# Patient Record
Sex: FEMALE | Race: ASIAN | NOT HISPANIC OR LATINO | Employment: OTHER | URBAN - METROPOLITAN AREA
[De-identification: names, ages, dates, MRNs, and addresses within clinical notes are randomized per-mention and may not be internally consistent; named-entity substitution may affect disease eponyms.]

---

## 2017-01-06 ENCOUNTER — APPOINTMENT (OUTPATIENT)
Dept: MAMMOGRAPHY | Facility: HOSPITAL | Age: 82
End: 2017-01-06

## 2017-01-11 ENCOUNTER — HOSPITAL ENCOUNTER (OUTPATIENT)
Dept: PET IMAGING | Facility: HOSPITAL | Age: 82
End: 2017-01-11
Attending: INTERNAL MEDICINE

## 2017-01-11 ENCOUNTER — HOSPITAL ENCOUNTER (OUTPATIENT)
Dept: CT IMAGING | Facility: HOSPITAL | Age: 82
Discharge: HOME | End: 2017-01-11
Attending: INTERNAL MEDICINE | Admitting: INTERNAL MEDICINE

## 2017-01-11 DIAGNOSIS — C80.1 ADENOCARCINOMA (HCC): ICD-10-CM

## 2017-01-11 PROCEDURE — 71250 CT THORAX DX C-: CPT

## 2017-01-27 ENCOUNTER — APPOINTMENT (OUTPATIENT)
Dept: LAB | Facility: HOSPITAL | Age: 82
End: 2017-01-27

## 2017-02-15 ENCOUNTER — HOSPITAL ENCOUNTER (OUTPATIENT)
Dept: MAMMOGRAPHY | Facility: HOSPITAL | Age: 82
Discharge: HOME | End: 2017-02-15
Admitting: INTERNAL MEDICINE

## 2017-02-15 ENCOUNTER — APPOINTMENT (OUTPATIENT)
Dept: MAMMOGRAPHY | Facility: HOSPITAL | Age: 82
End: 2017-02-15

## 2017-02-15 DIAGNOSIS — Z13.9 SCREENING: ICD-10-CM

## 2017-02-15 PROCEDURE — G0202 SCR MAMMO BI INCL CAD: HCPCS

## 2017-02-28 ENCOUNTER — LAB (OUTPATIENT)
Dept: LAB | Facility: HOSPITAL | Age: 82
End: 2017-02-28

## 2017-02-28 ENCOUNTER — OFFICE VISIT (OUTPATIENT)
Dept: ONCOLOGY | Facility: CLINIC | Age: 82
End: 2017-02-28

## 2017-02-28 VITALS
TEMPERATURE: 97.6 F | DIASTOLIC BLOOD PRESSURE: 60 MMHG | RESPIRATION RATE: 16 BRPM | BODY MASS INDEX: 19.67 KG/M2 | HEART RATE: 54 BPM | HEIGHT: 60 IN | SYSTOLIC BLOOD PRESSURE: 140 MMHG | WEIGHT: 100.2 LBS | OXYGEN SATURATION: 99 %

## 2017-02-28 DIAGNOSIS — C80.1 ADENOCARCINOMA (HCC): ICD-10-CM

## 2017-02-28 DIAGNOSIS — C80.1 ADENOCARCINOMA (HCC): Primary | ICD-10-CM

## 2017-02-28 LAB
BASOPHILS # BLD AUTO: 0.04 10*3/MM3 (ref 0–0.1)
BASOPHILS NFR BLD AUTO: 0.6 % (ref 0–1.1)
DEPRECATED RDW RBC AUTO: 40.5 FL (ref 37–49)
EOSINOPHIL # BLD AUTO: 0.09 10*3/MM3 (ref 0–0.36)
EOSINOPHIL NFR BLD AUTO: 1.3 % (ref 1–5)
ERYTHROCYTE [DISTWIDTH] IN BLOOD BY AUTOMATED COUNT: 12.3 % (ref 11.7–14.5)
HCT VFR BLD AUTO: 38.7 % (ref 34–45)
HGB BLD-MCNC: 12.5 G/DL (ref 11.5–14.9)
IMM GRANULOCYTES # BLD: 0.05 10*3/MM3 (ref 0–0.03)
IMM GRANULOCYTES NFR BLD: 0.7 % (ref 0–0.5)
LYMPHOCYTES # BLD AUTO: 1.7 10*3/MM3 (ref 1–3.5)
LYMPHOCYTES NFR BLD AUTO: 23.7 % (ref 20–49)
MCH RBC QN AUTO: 29 PG (ref 27–33)
MCHC RBC AUTO-ENTMCNC: 32.3 G/DL (ref 32–35)
MCV RBC AUTO: 89.8 FL (ref 83–97)
MONOCYTES # BLD AUTO: 0.55 10*3/MM3 (ref 0.25–0.8)
MONOCYTES NFR BLD AUTO: 7.7 % (ref 4–12)
NEUTROPHILS # BLD AUTO: 4.75 10*3/MM3 (ref 1.5–7)
NEUTROPHILS NFR BLD AUTO: 66 % (ref 39–75)
NRBC BLD MANUAL-RTO: 0 /100 WBC (ref 0–0)
PLATELET # BLD AUTO: 235 10*3/MM3 (ref 150–375)
PMV BLD AUTO: 10.4 FL (ref 8.9–12.1)
RBC # BLD AUTO: 4.31 10*6/MM3 (ref 3.9–5)
WBC NRBC COR # BLD: 7.18 10*3/MM3 (ref 4–10)

## 2017-02-28 PROCEDURE — 85025 COMPLETE CBC W/AUTO DIFF WBC: CPT | Performed by: INTERNAL MEDICINE

## 2017-02-28 PROCEDURE — 36416 COLLJ CAPILLARY BLOOD SPEC: CPT | Performed by: INTERNAL MEDICINE

## 2017-02-28 PROCEDURE — 99214 OFFICE O/P EST MOD 30 MIN: CPT | Performed by: INTERNAL MEDICINE

## 2017-02-28 RX ORDER — AMLODIPINE AND VALSARTAN 10; 320 MG/1; MG/1
1 TABLET ORAL DAILY
COMMUNITY
End: 2018-07-18 | Stop reason: ALTCHOICE

## 2017-02-28 NOTE — PROGRESS NOTES
Subjective    REASONS FOR FOLLOWUP:   1. 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   2. 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   3. Primary radiation to left lung bronchoalveolar carcinoma given in March 2013.   4. EGFR mutation negative in the left upper lobe cancer.   5. Multinodular goiter.          History of Present Illness  patient is an 86-year-old female with remote history of bronchioloalveolar carcinoma in 2002 and a second malignancy in the left lung in 2013 treated with focused radiation.  She comes in today with a 6 month follow-up and scans and overall she is doing well.  She has no weight loss or hemoptysis or pain in the chest.  She has a chronic cough but is not very significant and does not require much treatment.  Appetite and energy are fine.  She continues with mammography and follows with her family doctor for her medical issues routinely.  Thankfully the CAT scan shows stable 6mm  nodule in the right middle lobe since 2012 with no new abnormalities.  Her thyroid goiter is also stable.  She's had a mammogram and this was normal but I think at her age we could stop the mammograms and after discussion with her we have decided to stop CAT scans also because she is not likely to take any treatment if we find any malignancies and we will speak to chest x-rays for the time being as she would consider focused radiation alone if there was an option to do so     PAST MEDICAL HISTORY: Significant for type 2 diabetes, a leaky mitral valve, history of positive PPD, history of multinodular goiter with a thyroid needle biopsy, which was negative, history of osteoarthritis, hypertension and hypercholesterolemia and is on medication for this , and she has some reflux type symptoms.     PAST SURGICAL HISTORY: Hysterectomy in 1970, benign cyst removed from the vocal cord in 1972, cataract surgery and right upper lobectomy in June 2002.       OB/GYN HISTORY:   1, para 1.     ONCOLOGIC HISTORY: History from previous dates can be found in the separate document.   ONCOLOGIC HISTORY: Well differentiated adenocarcinoma left upper lobe measuring 2 x 2 x 1 cm, negative lymph nodes, T1N0 post right upper lobectomy. No adjuvant treatment.   The patient is an 82-year-old Chinese lady with history of T1N0 well-differentiated adenocarcinoma of the right upper lobe, treated with lobectomy in 2002 who was followed perspectively with scans and in  was noted to have a small abnormality in t he left lung which was described as ground glass opacity. This remained unchanged through , but in 2012 CT of the chest showed the ground glass opacity in the perihilar region appeared slightly larger and more confluent than the previous scan i n  and the curvilinear area of patchy focal density in the left apex that was new, which was felt to be scarring. There was no adenopathy appreciated. This led to a bronchoscopy by Dr. Tha Brandt in 2012, which was nondiagnostic. She was also referred to a gastroenterologist because of minimal rectal bleeding and underwent a colonoscopy, which was unremarkable. Followup chest CT on 10/17/12 without contrast at [facility] showed further enlargement of the lingular infiltrate to 2.3 x 2.2 cm comp a red to 1.8 x 2.2 in May. Dr. Brandt discussed the situation with her and told her that he did not think she would tolerate surgery because of her age and the previous right upper lobectomy and felt that she may not do well with chemo and radiation either a nd because she wanted another opinion, she visited her son in [place] and went to [facility] where she was seen at the Lung Clinic. She was presented to their Tumor Board and they recommended that she have a CT with contrast and CT guided biopsy of the are a before making any decisions and repeat PFTs to see if she would be a surgical candidate. The patient proceeded with a CT on  12/27/12 and PFTs and decided to see me for a third opinion regarding her cancer and treatment options.   The patient was present ed at thoracic clinic and CT guided biopsy was felt to be the best approach as they did not think EBUS or navigational bronchoscopy would yield a diagnosis. The radiologist felt this was feasible and was performed 1/7/13 with the findings of a well diffe r entiated adenocarcinoma. Pre-biopsy PET scan showed very low level uptake in the lesion and no adenopathy. The patient was not felt to be a surgical candidate because of her age and previous right upper lobectomy and focused radiation was felt to be her best option. The patient had been to [facility] for a second opinion and they also concurred with this approach. EGFR mutation status is not known but ordered because of her ethnic origin and the fact that if she has recurrence of disease after radiation, thi s could be a consideration of therapy.   The patient is EGFR mutation negative.   Patient treated with primary radiation to the left lung carcinoma in March. CAT scans dated 5/22/13 showed ground-glass opacity in the left hilar region shrunk from 3.1 x 3.1 to 2.2 x 2.   The patient was seen on 02/17/2014 with a repeat CT scan of the chest and abdomen, dated 02/10/2014 that shows a multinodular goiter, which is unchanged, and further resolution of the radiation changes with no definite mass, and the abdomen is benign.   CAT scans dated 8/2014 shows persistent finding of chronic infiltrates stable since February. No signs of recurrent cancer (no contrast given).   The patient was seen 02/05/2015 with a CAT scan that shows stable radiation changes in the left upper lobe and stable small nodule in the right upper lobe. Continued followup planned.   The patient is seen on 07/30/2015 with a CT scan of chest and abdomen with contrast that showed no evidence of recurrent cancer. There are radiation changes in left l nora with fiducial markers in  the region, and previous right upper lobectomy and a stable 6 mm nodule in the right lobe unchanged for over 3 years. Abdomen is benign.   Patient was seen on 2016 with a CT scan of the chest without contrast that showed a stable 6 mm nodule in the right middle lobe and no other lung nodules.     SOCIAL HISTORY: She is  for the second time. She smoked briefly for 18 years and stopped 50 years ago. She is not a drinker.     FAMILY HISTORY: Father  at age 65 of a blood clot. Mother  at age 86 of a fall. She has 2 brothers and 3 sisters. One sister had breast c ancer at a young age and  of kidney failure and one from complications of schizophrenia at 56. Another sister  of heart problems at age 65. No other cancer in the family. Her son is healthy.     Review of Systems   Respiratory: Positive for cough.       A comprehensive 14 point review of systems was performed and was negative except as mentioned.      Current Outpatient Prescriptions:   •  ACETAMINOPHEN PO, Take  by mouth., Disp: , Rfl:   •  amLODIPine-valsartan (EXFORGE)  MG per tablet, Take 1 tablet by mouth Daily., Disp: , Rfl:   •  Aspirin (ASPIR-81 PO), Take  by mouth daily., Disp: , Rfl:   •  atenolol (TENORMIN) 25 MG tablet, Take 25 mg by mouth daily., Disp: , Rfl:   •  Cholecalciferol (VITAMIN D) 1000 UNITS tablet, Take 2 tablets by mouth., Disp: , Rfl:   •  Docusate Sodium (STOOL SOFTENER) 100 MG capsule, Take  by mouth., Disp: , Rfl:   •  fenofibrate (TRICOR) 145 MG tablet, Take  by mouth daily., Disp: , Rfl:   •  glimepiride (AMARYL) 1 MG tablet, Take  by mouth 2 (Two) Times a Day., Disp: , Rfl:   •  losartan (COZAAR) 100 MG tablet, Take  by mouth., Disp: , Rfl:   •  Magnesium 100 MG capsule, Take  by mouth., Disp: , Rfl:   •  Multiple Vitamin (MULTIVITAMIN) tablet, Take 1 tablet by mouth., Disp: , Rfl:   •  rosuvastatin (CRESTOR) 10 MG tablet, Take 10 mg by mouth., Disp: , Rfl:     ALLERGIES:    Allergies   Allergen  "Reactions   • Contrast Dye    • Iodinated Diagnostic Agents Rash       Objective      Vitals:    02/28/17 1127   BP: 140/60   Pulse: 54   Resp: 16   Temp: 97.6 °F (36.4 °C)   TempSrc: Oral   SpO2: 99%   Weight: 100 lb 3.2 oz (45.5 kg)   Height: 59.8\" (151.9 cm)  Comment: new ht.   PainSc: 0-No pain     Current Status 2/28/2017   ECOG score 0       Physical Exam    GENERAL:  Well-developed, well-nourished in no acute distress.   SKIN:  Warm, dry without rashes, purpura or petechiae.  HEAD:  Normocephalic.  EYES:  Pupils equal, round and reactive to light.  EOMs intact.  Conjunctivae normal.  EARS:  Hearing intact.  NOSE:  Septum midline.  No excoriations or nasal discharge.  MOUTH:  Tongue is well-papillated; no stomatitis or ulcers.  Lips normal.  THROAT:  Oropharynx without lesions or exudates.  Moderate thyromegaly is stable  NECK:  Supple with good range of motion; thyromegaly noted no  masses, no JVD.  LYMPHATICS:  No cervical, supraclavicular, axillary or inguinal adenopathy.  CHEST:  Lungs clear to percussion and auscultation. Good airflow.  CARDIAC:  Regular rate and rhythm without murmurs, rubs or gallops. Normal S1,S2.  ABDOMEN:  Soft, nontender with no organomegaly or masses.  EXTREMITIES:  No clubbing, cyanosis or edema.  NEUROLOGICAL:  Cranial Nerves II-XII grossly intact.  No focal neurological deficits.  PSYCHIATRIC:  Normal affect and mood.        RECENT LABS:  Hematology WBC   Date Value Ref Range Status   02/28/2017 7.18 4.00 - 10.00 10*3/mm3 Final   02/26/2016 5.67 4.50 - 10.70 K/Cumm Final     RBC   Date Value Ref Range Status   02/28/2017 4.31 3.90 - 5.00 10*6/mm3 Final   02/26/2016 4.48 3.90 - 5.20 Million Final     HEMOGLOBIN   Date Value Ref Range Status   02/28/2017 12.5 11.5 - 14.9 g/dL Final   02/26/2016 13.0 11.9 - 15.5 g/dL Final     HEMATOCRIT   Date Value Ref Range Status   02/28/2017 38.7 34.0 - 45.0 % Final   02/26/2016 41.1 35.6 - 45.5 % Final     PLATELETS   Date Value Ref Range " Status   02/28/2017 235 150 - 375 10*3/mm3 Final   02/26/2016 309 140 - 500 K/Cumm Final              CT CHEST  FINDINGS: The patient is status post right upper lobectomy. There is a  small approximately 6 mm diameter noncalcified pulmonary nodule in the  posterior aspect of the right middle lobe that remains stable. Chronic  areas of consolidation and atelectasis in the medial aspect of the left  lung are also unchanged. These are likely sequela of radiation therapy.  There are no acute infiltrates or pleural effusions. There is no  mediastinal or hilar or axillary lymphadenopathy. The thyroid gland is  moderately enlarged and contains multiple areas of nodularity. Findings  are consistent with multinodular goiter. This is unchanged. The enlarged  gland produces mild tracheal compression.      IMPRESSION:  Postoperative changes and post RT changes as described.  Small noncalcified right middle lobe pulmonary nodule. There has been no  significant change since the preceding CT chest dated 08/01/2016.      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   3.primary radiation to left lung bronchoalveolar carcinoma given in March 2013.   4.EGFR mutation negative in the left upper lobe cancer.   5.Multinodular goiter.     Plan  Return in 6 months with c x-ray and we will not plan any further CAT scans at this time and I told her she could also stop her annual mammograms at her age            2/28/2017      CC:

## 2017-03-24 DIAGNOSIS — R00.2 PALPITATIONS: Primary | ICD-10-CM

## 2017-03-29 ENCOUNTER — OFFICE VISIT (OUTPATIENT)
Dept: CARDIOLOGY | Facility: CLINIC | Age: 82
End: 2017-03-29

## 2017-03-29 VITALS
BODY MASS INDEX: 21.2 KG/M2 | HEART RATE: 62 BPM | HEIGHT: 58 IN | WEIGHT: 101 LBS | SYSTOLIC BLOOD PRESSURE: 124 MMHG | DIASTOLIC BLOOD PRESSURE: 74 MMHG

## 2017-03-29 DIAGNOSIS — I34.0 NON-RHEUMATIC MITRAL REGURGITATION: ICD-10-CM

## 2017-03-29 DIAGNOSIS — R00.2 PALPITATION: ICD-10-CM

## 2017-03-29 DIAGNOSIS — I10 ESSENTIAL HYPERTENSION: Primary | ICD-10-CM

## 2017-03-29 PROCEDURE — 99213 OFFICE O/P EST LOW 20 MIN: CPT | Performed by: INTERNAL MEDICINE

## 2017-03-29 PROCEDURE — 93000 ELECTROCARDIOGRAM COMPLETE: CPT | Performed by: INTERNAL MEDICINE

## 2017-03-29 NOTE — PROGRESS NOTES
Date of Office Visit: 2017  Encounter Provider: Moe Heredia MD  Place of Service: [facility] CARDIOLOGY  Patient Name: Gael Tarango  :1930      Chief Complaint   Patient presents with   • Palpitations     History of Present Illness  Patient is an 86-year-old female with a history of mitral regurgitation as well as hypertension.  She returns to the office today with complaints of palpitations.  She states she hears a pounding in her chest that occurs mostly when she is up working at her desk during the day.  She does not.  If she is standing up or ambulating.  She does not.  Nocturnally.  A recent Holter monitor was placed which was very benign showing only occasional unifocal premature ventricular ectopics and rare PACs.  There is no evidence of atrial fibrillation.  She is on both atenolol and Exforge for blood pressure control.  Her pressure has been under better control with the addition of the Exforge.  She takes her atenolol at night and the Exforge in the morning.  I suggested that before we change her medication that she try reversing the time scheduled.  Her symptoms appeared to be during the day and perhaps the atenolol might be a better choice for her morning medication.      Past Medical History:   Diagnosis Date   • Arthritis    • Diabetes mellitus     Type 2   • Disease of thyroid gland    • Goiter     Multinodular   • Lung cancer     Right lung, stage IA bronchoalveolar carcinoma   • Lung cancer     Left lung T1N0, well differentiated bronchoalveolar carcinoma   • Mitral valve regurgitation    • 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     Positive PPD   • Vocal cord cyst     Removed         Past Surgical History:   Procedure Laterality Date   • CATARACT EXTRACTION Bilateral    • HYSTERECTOMY  1970   • LUNG LOBECTOMY Right 2002   • VOCAL CORD BIOPSY  1972    Benign cyst removed           Current Outpatient Prescriptions:   •  ACETAMINOPHEN PO, Take   by mouth., Disp: , Rfl:   •  amLODIPine-valsartan (EXFORGE)  MG per tablet, Take 1 tablet by mouth Daily., Disp: , Rfl:   •  Aspirin (ASPIR-81 PO), Take  by mouth daily., Disp: , Rfl:   •  atenolol (TENORMIN) 25 MG tablet, Take 25 mg by mouth daily., Disp: , Rfl:   •  Cholecalciferol (VITAMIN D) 1000 UNITS tablet, Take 2 tablets by mouth., Disp: , Rfl:   •  Docusate Sodium (STOOL SOFTENER) 100 MG capsule, Take  by mouth., Disp: , Rfl:   •  fenofibrate (TRICOR) 145 MG tablet, Take  by mouth daily., Disp: , Rfl:   •  glimepiride (AMARYL) 1 MG tablet, Take  by mouth 2 (Two) Times a Day., Disp: , Rfl:   •  Magnesium 100 MG capsule, Take  by mouth., Disp: , Rfl:   •  Multiple Vitamin (MULTIVITAMIN) tablet, Take 1 tablet by mouth., Disp: , Rfl:   •  rosuvastatin (CRESTOR) 10 MG tablet, Take 10 mg by mouth., Disp: , Rfl:       Social History     Social History   • Marital status:      Spouse name: N/A   • Number of children: N/A   • Years of education: College     Occupational History   • Teacher Retired     [employer]     Social History Main Topics   • Smoking status: Former Smoker     Packs/day: 1.00     Years: 18.00     Types: Cigarettes   • Smokeless tobacco: Not on file   • Alcohol use No   • Drug use: No   • Sexual activity: Not on file     Other Topics Concern   • Not on file     Social History Narrative         Review of Systems   Constitution: Negative.   HENT: Negative.    Eyes: Negative.    Cardiovascular: Positive for palpitations.   Respiratory: Negative.    Endocrine: Negative.    Skin: Negative.    Musculoskeletal: Negative.    Gastrointestinal: Negative.    Neurological: Negative.    Psychiatric/Behavioral: Negative.        Procedures      ECG 12 Lead  Date/Time: 3/29/2017 3:49 PM  Performed by: BARTOLO MENDEZ  Authorized by: BARTOLO MENDEZ   Comparison: compared with previous ECG from 9/1/2016  Rhythm: sinus rhythm  Rate: normal  Conduction: left bundle branch block              "  Objective:    /74  Pulse 62  Ht 58\" (147.3 cm)  Wt 101 lb (45.8 kg)  LMP  (Approximate)  BMI 21.11 kg/m2        Physical Exam   Constitutional: She is oriented to person, place, and time. She appears well-developed and well-nourished.   HENT:   Head: Normocephalic.   Eyes: Pupils are equal, round, and reactive to light.   Neck: Normal range of motion. No JVD present. Carotid bruit is not present. No thyromegaly present.   Cardiovascular: Normal rate, regular rhythm, S1 normal, S2 normal and intact distal pulses.  Exam reveals no gallop and no friction rub.    Murmur heard.  High-pitched blowing holosystolic murmur is present with a grade of 1/6  at the apex  Pulmonary/Chest: Effort normal and breath sounds normal.   Abdominal: Soft. Bowel sounds are normal.   Musculoskeletal: She exhibits no edema.   Neurological: She is alert and oriented to person, place, and time.   Skin: Skin is warm, dry and intact. No erythema.   Psychiatric: She has a normal mood and affect.   Vitals reviewed.          Assessment:       Diagnosis Plan   1. Essential hypertension     2. Non-rheumatic mitral regurgitation     3. Palpitation              Plan:       She is going to take the atenolol and the morning and the Exforge in the evening as I discussed above.  I'm hopeful that this will provide her some relief of her symptoms.  I cannot find anything of significance on the examination otherwise.  "

## 2017-04-06 ENCOUNTER — TELEPHONE (OUTPATIENT)
Dept: CARDIOLOGY | Facility: CLINIC | Age: 82
End: 2017-04-06

## 2017-04-06 NOTE — TELEPHONE ENCOUNTER
"Pt called with a 1 week update.   She states that since switching her Atenolol to 10:00 am the \"loud thumping\" has improved. She still notices it early before taking her med. It does return after meals but it goes away shortly after.     She would like you to advise further.     Pt can be reached at [phone]  "

## 2017-04-14 NOTE — TELEPHONE ENCOUNTER
Pt called. She states that she noticed no change until this past Wednesday. Now the thumping only happens in spurts. They last from 5-45 minutes. She has noticed they are normally triggered by exertion.     Her bp today was 132/62 hr 43.    She would like you to advise.     Pt can be reached at [phone]

## 2017-04-21 RX ORDER — ATENOLOL 25 MG/1
37.5 TABLET ORAL 2 TIMES DAILY
Qty: 270 TABLET | Refills: 1 | Status: SHIPPED | OUTPATIENT
Start: 2017-04-21 | End: 2017-09-30 | Stop reason: SDUPTHER

## 2017-04-21 NOTE — TELEPHONE ENCOUNTER
Pt called with an update, the thumping of her heart was better this week, only happening for about 10 minutes when eating breakfast. Today she didn't have any. She feels she is almost back to normal. She will need a new rx for the increased dose that I will send to [facility]. Pt # [phone]. dmk

## 2017-08-25 ENCOUNTER — OFFICE VISIT (OUTPATIENT)
Dept: ONCOLOGY | Facility: CLINIC | Age: 82
End: 2017-08-25

## 2017-08-25 ENCOUNTER — LAB (OUTPATIENT)
Dept: LAB | Facility: HOSPITAL | Age: 82
End: 2017-08-25

## 2017-08-25 ENCOUNTER — APPOINTMENT (OUTPATIENT)
Dept: GENERAL RADIOLOGY | Facility: HOSPITAL | Age: 82
End: 2017-08-25

## 2017-08-25 VITALS
HEART RATE: 51 BPM | TEMPERATURE: 97.8 F | BODY MASS INDEX: 19.71 KG/M2 | RESPIRATION RATE: 16 BRPM | OXYGEN SATURATION: 100 % | WEIGHT: 100.4 LBS | SYSTOLIC BLOOD PRESSURE: 156 MMHG | DIASTOLIC BLOOD PRESSURE: 64 MMHG | HEIGHT: 60 IN

## 2017-08-25 DIAGNOSIS — C80.1 ADENOCARCINOMA (HCC): Primary | ICD-10-CM

## 2017-08-25 DIAGNOSIS — C34.2 MALIGNANT NEOPLASM OF MIDDLE LOBE OF RIGHT LUNG (HCC): ICD-10-CM

## 2017-08-25 DIAGNOSIS — C80.1 ADENOCARCINOMA (HCC): ICD-10-CM

## 2017-08-25 LAB
ALBUMIN SERPL-MCNC: 4.4 G/DL (ref 3.5–5.2)
ALBUMIN/GLOB SERPL: 1.8 G/DL (ref 1.1–2.4)
ALP SERPL-CCNC: 46 U/L (ref 38–116)
ALT SERPL W P-5'-P-CCNC: 21 U/L (ref 0–33)
ANION GAP SERPL CALCULATED.3IONS-SCNC: 11.2 MMOL/L
AST SERPL-CCNC: 27 U/L (ref 0–32)
BASOPHILS # BLD AUTO: 0.03 10*3/MM3 (ref 0–0.1)
BASOPHILS NFR BLD AUTO: 0.5 % (ref 0–1.1)
BILIRUB SERPL-MCNC: 0.4 MG/DL (ref 0.1–1.2)
BUN BLD-MCNC: 20 MG/DL (ref 6–20)
BUN/CREAT SERPL: 23.8 (ref 7.3–30)
CALCIUM SPEC-SCNC: 9.8 MG/DL (ref 8.5–10.2)
CHLORIDE SERPL-SCNC: 99 MMOL/L (ref 98–107)
CO2 SERPL-SCNC: 28.8 MMOL/L (ref 22–29)
CREAT BLD-MCNC: 0.84 MG/DL (ref 0.6–1.1)
DEPRECATED RDW RBC AUTO: 42.5 FL (ref 37–49)
EOSINOPHIL # BLD AUTO: 0.09 10*3/MM3 (ref 0–0.36)
EOSINOPHIL NFR BLD AUTO: 1.4 % (ref 1–5)
ERYTHROCYTE [DISTWIDTH] IN BLOOD BY AUTOMATED COUNT: 12.9 % (ref 11.7–14.5)
GFR SERPL CREATININE-BSD FRML MDRD: 64 ML/MIN/1.73
GFR SERPL CREATININE-BSD FRML MDRD: 78 ML/MIN/1.73
GLOBULIN UR ELPH-MCNC: 2.4 GM/DL (ref 1.8–3.5)
GLUCOSE BLD-MCNC: 204 MG/DL (ref 74–124)
HCT VFR BLD AUTO: 38.9 % (ref 34–45)
HGB BLD-MCNC: 13 G/DL (ref 11.5–14.9)
HOLD SPECIMEN: NORMAL
IMM GRANULOCYTES # BLD: 0.03 10*3/MM3 (ref 0–0.03)
IMM GRANULOCYTES NFR BLD: 0.5 % (ref 0–0.5)
LYMPHOCYTES # BLD AUTO: 1.51 10*3/MM3 (ref 1–3.5)
LYMPHOCYTES NFR BLD AUTO: 23.3 % (ref 20–49)
MCH RBC QN AUTO: 30.3 PG (ref 27–33)
MCHC RBC AUTO-ENTMCNC: 33.4 G/DL (ref 32–35)
MCV RBC AUTO: 90.7 FL (ref 83–97)
MONOCYTES # BLD AUTO: 0.57 10*3/MM3 (ref 0.25–0.8)
MONOCYTES NFR BLD AUTO: 8.8 % (ref 4–12)
NEUTROPHILS # BLD AUTO: 4.25 10*3/MM3 (ref 1.5–7)
NEUTROPHILS NFR BLD AUTO: 65.5 % (ref 39–75)
NRBC BLD MANUAL-RTO: 0 /100 WBC (ref 0–0)
PLATELET # BLD AUTO: 267 10*3/MM3 (ref 150–375)
PMV BLD AUTO: 9.4 FL (ref 8.9–12.1)
POTASSIUM BLD-SCNC: 4.2 MMOL/L (ref 3.5–4.7)
PROT SERPL-MCNC: 6.8 G/DL (ref 6.3–8)
RBC # BLD AUTO: 4.29 10*6/MM3 (ref 3.9–5)
SODIUM BLD-SCNC: 139 MMOL/L (ref 134–145)
WBC NRBC COR # BLD: 6.48 10*3/MM3 (ref 4–10)

## 2017-08-25 PROCEDURE — 80053 COMPREHEN METABOLIC PANEL: CPT | Performed by: INTERNAL MEDICINE

## 2017-08-25 PROCEDURE — 71020 HC CHEST PA AND LATERAL: CPT

## 2017-08-25 PROCEDURE — 99213 OFFICE O/P EST LOW 20 MIN: CPT | Performed by: INTERNAL MEDICINE

## 2017-08-25 PROCEDURE — 85025 COMPLETE CBC W/AUTO DIFF WBC: CPT | Performed by: INTERNAL MEDICINE

## 2017-08-25 PROCEDURE — 36415 COLL VENOUS BLD VENIPUNCTURE: CPT | Performed by: INTERNAL MEDICINE

## 2017-08-25 RX ORDER — MULTIVITAMIN WITH IRON
TABLET ORAL DAILY
COMMUNITY

## 2017-08-25 NOTE — PROGRESS NOTES
Subjective    REASONS FOR FOLLOWUP:   1. 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   2. 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   3. Primary radiation to left lung bronchoalveolar carcinoma given in 2013.   4. EGFR mutation negative in the left upper lobe cancer.   5. Multinodular goiter.          History of Present Illness patient is an 86-year-old female with 2 separate lung cancers treated with surgery on 1 occasion and radiation on the other most recent one being in 2013.  She comes in for follow-up today doing fairly well.  Her cough is actually improved.  Weight stable appetite is stable she has no new complaints  She had a chest x-ray today which was thankfully benign but there is some scarring in the right upper lobe that may need some closer detail.  We discussed and decided to do a noncontrast chest CT at her next visit just to make sure there's been no change as she is actually pretty healthy and may be able to tolerate focused radiation again if a new cancer pops up.      PAST MEDICAL HISTORY: Significant for type 2 diabetes, a leaky mitral valve, history of positive PPD, history of multinodular goiter with a thyroid needle biopsy, which was negative, history of osteoarthritis, hypertension and hypercholesterolemia and is on medication for this , and she has some reflux type symptoms.     PAST SURGICAL HISTORY: Hysterectomy in , benign cyst removed from the vocal cord in , cataract surgery and right upper lobectomy in 2002.       OB/GYN HISTORY:  1, para 1.     ONCOLOGIC HISTORY: History from previous dates can be found in the separate document.   ONCOLOGIC HISTORY: Well differentiated adenocarcinoma left upper lobe measuring 2 x 2 x 1 cm, negative lymph nodes, T1N0 post right upper lobectomy. No adjuvant treatment.   The patient is an 82-year-old Chinese lady with history of T1N0 well-differentiated adenocarcinoma of  the right upper lobe, treated with lobectomy in June 2002 who was followed perspectively with scans and in 2008 was noted to have a small abnormality in t he left lung which was described as ground glass opacity. This remained unchanged through 2010, but in April 2012 CT of the chest showed the ground glass opacity in the perihilar region appeared slightly larger and more confluent than the previous scan i n 2010 and the curvilinear area of patchy focal density in the left apex that was new, which was felt to be scarring. There was no adenopathy appreciated. This led to a bronchoscopy by Dr. Tha Brandt in June 2012, which was nondiagnostic. She was also referred to a gastroenterologist because of minimal rectal bleeding and underwent a colonoscopy, which was unremarkable. Followup chest CT on 10/17/12 without contrast at [facility] showed further enlargement of the lingular infiltrate to 2.3 x 2.2 cm comp a red to 1.8 x 2.2 in May. Dr. Brandt discussed the situation with her and told her that he did not think she would tolerate surgery because of her age and the previous right upper lobectomy and felt that she may not do well with chemo and radiation either a nd because she wanted another opinion, she visited her son in [place] and went to [facility] where she was seen at the Lung Clinic. She was presented to their Tumor Board and they recommended that she have a CT with contrast and CT guided biopsy of the are a before making any decisions and repeat PFTs to see if she would be a surgical candidate. The patient proceeded with a CT on 12/27/12 and PFTs and decided to see me for a third opinion regarding her cancer and treatment options.   The patient was present ed at thoracic clinic and CT guided biopsy was felt to be the best approach as they did not think EBUS or navigational bronchoscopy would yield a diagnosis. The radiologist felt this was feasible and was performed 1/7/13 with the findings of a well diffe r  entiated adenocarcinoma. Pre-biopsy PET scan showed very low level uptake in the lesion and no adenopathy. The patient was not felt to be a surgical candidate because of her age and previous right upper lobectomy and focused radiation was felt to be her best option. The patient had been to [facility] for a second opinion and they also concurred with this approach. EGFR mutation status is not known but ordered because of her ethnic origin and the fact that if she has recurrence of disease after radiation, thi s could be a consideration of therapy.   The patient is EGFR mutation negative.   Patient treated with primary radiation to the left lung carcinoma in March. CAT scans dated 5/22/13 showed ground-glass opacity in the left hilar region shrunk from 3.1 x 3.1 to 2.2 x 2.   The patient was seen on 02/17/2014 with a repeat CT scan of the chest and abdomen, dated 02/10/2014 that shows a multinodular goiter, which is unchanged, and further resolution of the radiation changes with no definite mass, and the abdomen is benign.   CAT scans dated 8/2014 shows persistent finding of chronic infiltrates stable since February. No signs of recurrent cancer (no contrast given).   The patient was seen 02/05/2015 with a CAT scan that shows stable radiation changes in the left upper lobe and stable small nodule in the right upper lobe. Continued followup planned.   The patient is seen on 07/30/2015 with a CT scan of chest and abdomen with contrast that showed no evidence of recurrent cancer. There are radiation changes in left l nora with fiducial markers in the region, and previous right upper lobectomy and a stable 6 mm nodule in the right lobe unchanged for over 3 years. Abdomen is benign.   Patient was seen on 02/22/2016 with a CT scan of the chest without contrast that showed a stable 6 mm nodule in the right middle lobe and no other lung nodules.    patient is an 86-year-old female with remote history of bronchioloalveolar carcinoma  in  and a second malignancy in the left lung in  treated with focused radiation.  She comes in today with a 6 month follow-up and scans and overall she is doing well.  She has no weight loss or hemoptysis or pain in the chest.  She has a chronic cough but is not very significant and does not require much treatment.  Appetite and energy are fine.  She continues with mammography and follows with her family doctor for her medical issues routinely.  Thankfully the CAT scan shows stable 6mm  nodule in the right middle lobe since  with no new abnormalities.  Her thyroid goiter is also stable.  She's had a mammogram and this was normal       SOCIAL HISTORY: She is  for the second time. She smoked briefly for 18 years and stopped 50 years ago. She is not a drinker.     FAMILY HISTORY: Father  at age 65 of a blood clot. Mother  at age 86 of a fall. She has 2 brothers and 3 sisters. One sister had breast c ancer at a young age and  of kidney failure and one from complications of schizophrenia at 56. Another sister  of heart problems at age 65. No other cancer in the family. Her son is healthy.     Review of Systems   Respiratory: Positive for cough.       A comprehensive 14 point review of systems was performed and was negative except as mentioned.      Current Outpatient Prescriptions:   •  ACETAMINOPHEN PO, Take  by mouth., Disp: , Rfl:   •  amLODIPine-valsartan (EXFORGE)  MG per tablet, Take 1 tablet by mouth Daily., Disp: , Rfl:   •  Aspirin (ASPIR-81 PO), Take  by mouth daily., Disp: , Rfl:   •  atenolol (TENORMIN) 25 MG tablet, Take 1.5 tablets by mouth 2 (Two) Times a Day., Disp: 270 tablet, Rfl: 1  •  Cholecalciferol (VITAMIN D) 1000 UNITS tablet, Take 2 tablets by mouth., Disp: , Rfl:   •  Docusate Sodium (STOOL SOFTENER) 100 MG capsule, Take  by mouth., Disp: , Rfl:   •  fenofibrate (TRICOR) 145 MG tablet, Take  by mouth daily., Disp: , Rfl:   •  glimepiride (AMARYL) 1 MG  "tablet, Take  by mouth 2 (Two) Times a Day., Disp: , Rfl:   •  Magnesium 250 MG tablet, Take  by mouth Daily., Disp: , Rfl:   •  Multiple Vitamin (MULTIVITAMIN) tablet, Take 1 tablet by mouth., Disp: , Rfl:   •  rosuvastatin (CRESTOR) 10 MG tablet, Take 10 mg by mouth., Disp: , Rfl:     ALLERGIES:    Allergies   Allergen Reactions   • Contrast Dye    • Iodinated Diagnostic Agents Rash       Objective      Vitals:    08/25/17 1310   BP: 156/64   Pulse: 51   Resp: 16   Temp: 97.8 °F (36.6 °C)   TempSrc: Oral   SpO2: 100%   Weight: 100 lb 6.4 oz (45.5 kg)   Height: 60\" (152.4 cm)  Comment: new ht   PainSc: 0-No pain     Current Status 8/25/2017   ECOG score 0       Physical Exam    GENERAL:  Well-developed, well-nourished in no acute distress.   SKIN:  Warm, dry without rashes, purpura or petechiae.  HEAD:  Normocephalic.  EYES:  Pupils equal, round and reactive to light.  EOMs intact.  Conjunctivae normal.  EARS:  Hearing intact.  NOSE:  Septum midline.  No excoriations or nasal discharge.  MOUTH:  Tongue is well-papillated; no stomatitis or ulcers.  Lips normal.  THROAT:  Oropharynx without lesions or exudates.  Moderate thyromegaly is stable  NECK:  Supple with good range of motion; thyromegaly noted no  masses, no JVD.  LYMPHATICS:  No cervical, supraclavicular, axillary or inguinal adenopathy.  CHEST:  Lungs clear to percussion and auscultation. Good airflow.  CARDIAC:  Regular rate and rhythm without murmurs, rubs or gallops. Normal S1,S2.  ABDOMEN:  Soft, nontender with no organomegaly or masses.  EXTREMITIES:  No clubbing, cyanosis or edema.  NEUROLOGICAL:  Cranial Nerves II-XII grossly intact.  No focal neurological deficits.  PSYCHIATRIC:  Normal affect and mood.        RECENT LABS:  Hematology WBC   Date Value Ref Range Status   08/25/2017 6.48 4.00 - 10.00 10*3/mm3 Final     RBC   Date Value Ref Range Status   08/25/2017 4.29 3.90 - 5.00 10*6/mm3 Final     Hemoglobin   Date Value Ref Range Status "   08/25/2017 13.0 11.5 - 14.9 g/dL Final     Hematocrit   Date Value Ref Range Status   08/25/2017 38.9 34.0 - 45.0 % Final     Platelets   Date Value Ref Range Status   08/25/2017 267 150 - 375 10*3/mm3 Final                Chest x-ray seems stable on 8/25/17?  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   3.primary radiation to left lung bronchoalveolar carcinoma given in March 2013.   4.EGFR mutation negative in the left upper lobe cancer.   5.Multinodular goiter.     Plan  Return in 6 months with ct chest Without contrast    8/25/2017      CC:

## 2017-10-02 RX ORDER — ATENOLOL 25 MG/1
TABLET ORAL
Qty: 270 TABLET | Refills: 0 | Status: SHIPPED | OUTPATIENT
Start: 2017-10-02 | End: 2018-01-01 | Stop reason: SDUPTHER

## 2018-01-02 RX ORDER — ATENOLOL 25 MG/1
TABLET ORAL
Qty: 270 TABLET | Refills: 0 | Status: SHIPPED | OUTPATIENT
Start: 2018-01-02 | End: 2018-04-02 | Stop reason: SDUPTHER

## 2018-01-11 ENCOUNTER — OFFICE VISIT (OUTPATIENT)
Dept: CARDIOLOGY | Facility: CLINIC | Age: 83
End: 2018-01-11

## 2018-01-11 VITALS
WEIGHT: 101 LBS | HEIGHT: 59 IN | SYSTOLIC BLOOD PRESSURE: 140 MMHG | HEART RATE: 53 BPM | BODY MASS INDEX: 20.36 KG/M2 | DIASTOLIC BLOOD PRESSURE: 80 MMHG

## 2018-01-11 DIAGNOSIS — I10 ESSENTIAL HYPERTENSION: ICD-10-CM

## 2018-01-11 DIAGNOSIS — I34.0 NON-RHEUMATIC MITRAL REGURGITATION: Primary | ICD-10-CM

## 2018-01-11 PROCEDURE — 93000 ELECTROCARDIOGRAM COMPLETE: CPT | Performed by: INTERNAL MEDICINE

## 2018-01-11 PROCEDURE — 99214 OFFICE O/P EST MOD 30 MIN: CPT | Performed by: INTERNAL MEDICINE

## 2018-01-11 NOTE — PROGRESS NOTES
Date of Office Visit: 2018  Encounter Provider: Moe Heredia MD  Place of Service: [facility] CARDIOLOGY  Patient Name: Gael Tarango  :1930      Chief Complaint   Patient presents with   • Shortness of Breath     History of Present Illness    The patient is an 87-year-old white female with mitral regurgitation as well as hypertension.  She returns to the office today feeling fairly well dictations have decreased since we increased her atenolol.  She does have some shortness of breath with exertion but this is not anything that is significant at this particular time.  She does not complain of lightheadedness, dizziness, orthopnea or paroxysmal nocturnal dyspnea.  She feels that her pressure has been under good control.    Past Medical History:   Diagnosis Date   • Arthritis    • Diabetes mellitus     Type 2   • Disease of thyroid gland    • Goiter     Multinodular   • Lung cancer     Right lung, stage IA bronchoalveolar carcinoma   • Lung cancer     Left lung T1N0, well differentiated bronchoalveolar carcinoma   • Mitral valve regurgitation    • 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     Positive PPD   • Vocal cord cyst     Removed         Past Surgical History:   Procedure Laterality Date   • CATARACT EXTRACTION Bilateral    • HYSTERECTOMY  1970   • LUNG LOBECTOMY Right 2002   • VOCAL CORD BIOPSY  1972    Benign cyst removed           Current Outpatient Prescriptions:   •  ACETAMINOPHEN PO, Take  by mouth., Disp: , Rfl:   •  amLODIPine-valsartan (EXFORGE)  MG per tablet, Take 1 tablet by mouth Daily., Disp: , Rfl:   •  Aspirin (ASPIR-81 PO), Take  by mouth daily., Disp: , Rfl:   •  atenolol (TENORMIN) 25 MG tablet, TAKE ONE AND ONE-HALF TABLETS TWICE A DAY (NEED TO SCHEDULE AN APPOINTMENT), Disp: 270 tablet, Rfl: 0  •  Cholecalciferol (VITAMIN D) 1000 UNITS tablet, Take 2 tablets by mouth., Disp: , Rfl:   •  Docusate Sodium (STOOL SOFTENER) 100 MG capsule,  "Take  by mouth., Disp: , Rfl:   •  fenofibrate (TRICOR) 145 MG tablet, Take  by mouth daily., Disp: , Rfl:   •  glimepiride (AMARYL) 1 MG tablet, Take  by mouth 2 (Two) Times a Day., Disp: , Rfl:   •  Magnesium 250 MG tablet, Take  by mouth Daily., Disp: , Rfl:   •  rosuvastatin (CRESTOR) 10 MG tablet, Take 10 mg by mouth., Disp: , Rfl:       Social History     Social History   • Marital status:      Spouse name: N/A   • Number of children: N/A   • Years of education: College     Occupational History   • Teacher Retired     [employer]     Social History Main Topics   • Smoking status: Former Smoker     Packs/day: 1.00     Years: 18.00     Types: Cigarettes   • Smokeless tobacco: Never Used   • Alcohol use No   • Drug use: No   • Sexual activity: Not on file     Other Topics Concern   • Not on file     Social History Narrative         Review of Systems   Constitution: Negative.   HENT: Negative.    Eyes: Negative.    Cardiovascular: Positive for dyspnea on exertion.   Respiratory: Negative.    Endocrine: Negative.    Skin: Negative.    Musculoskeletal: Negative.    Gastrointestinal: Negative.    Neurological: Negative.    Psychiatric/Behavioral: Negative.        Procedures      ECG 12 Lead  Date/Time: 1/11/2018 12:51 PM  Performed by: BARTOLO MENDEZ  Authorized by: BARTOLO MENDEZ   Comparison: compared with previous ECG from 3/29/2017  Similar to previous ECG  Rhythm: sinus rhythm  Rate: normal  Conduction: left bundle branch block  QRS axis: normal                 Objective:    /80  Pulse 53  Ht 149.9 cm (59\")  Wt 45.8 kg (101 lb)  BMI 20.4 kg/m2        Physical Exam   Constitutional: She is oriented to person, place, and time. She appears well-developed and well-nourished.   HENT:   Head: Normocephalic.   Eyes: Pupils are equal, round, and reactive to light.   Neck: Normal range of motion. No JVD present. Carotid bruit is not present. No thyromegaly present.   Cardiovascular: Normal rate, " regular rhythm, S1 normal, S2 normal and intact distal pulses.  Exam reveals no gallop and no friction rub.    Murmur heard.  High-pitched blowing holosystolic murmur is present  at the apex  Pulmonary/Chest: Effort normal and breath sounds normal.   Abdominal: Soft. Bowel sounds are normal.   Musculoskeletal: She exhibits no edema.   Neurological: She is alert and oriented to person, place, and time.   Skin: Skin is warm, dry and intact. No erythema.   Psychiatric: She has a normal mood and affect.   Vitals reviewed.          Assessment:       Diagnosis Plan   1. Non-rheumatic mitral regurgitation     2. Essential hypertension     3.  Palpitations: Resolved         Plan:     No change in therapy at this time

## 2018-02-01 ENCOUNTER — HOSPITAL ENCOUNTER (OUTPATIENT)
Dept: PET IMAGING | Facility: HOSPITAL | Age: 83
Discharge: HOME | End: 2018-02-01
Attending: INTERNAL MEDICINE

## 2018-02-05 ENCOUNTER — HOSPITAL ENCOUNTER (OUTPATIENT)
Dept: PET IMAGING | Facility: HOSPITAL | Age: 83
Discharge: HOME | End: 2018-02-05
Attending: INTERNAL MEDICINE | Admitting: INTERNAL MEDICINE

## 2018-02-05 DIAGNOSIS — C80.1 ADENOCARCINOMA (HCC): ICD-10-CM

## 2018-02-05 PROCEDURE — 71250 CT THORAX DX C-: CPT

## 2018-02-08 ENCOUNTER — OFFICE VISIT (OUTPATIENT)
Dept: ONCOLOGY | Facility: CLINIC | Age: 83
End: 2018-02-08

## 2018-02-08 ENCOUNTER — LAB (OUTPATIENT)
Dept: LAB | Facility: HOSPITAL | Age: 83
End: 2018-02-08

## 2018-02-08 VITALS
OXYGEN SATURATION: 98 % | DIASTOLIC BLOOD PRESSURE: 58 MMHG | WEIGHT: 102 LBS | TEMPERATURE: 97.6 F | HEIGHT: 60 IN | HEART RATE: 55 BPM | RESPIRATION RATE: 12 BRPM | SYSTOLIC BLOOD PRESSURE: 128 MMHG | BODY MASS INDEX: 20.03 KG/M2

## 2018-02-08 DIAGNOSIS — C34.2 MALIGNANT NEOPLASM OF MIDDLE LOBE OF RIGHT LUNG (HCC): Primary | ICD-10-CM

## 2018-02-08 DIAGNOSIS — C80.1 ADENOCARCINOMA (HCC): ICD-10-CM

## 2018-02-08 LAB
BASOPHILS # BLD AUTO: 0.05 10*3/MM3 (ref 0–0.1)
BASOPHILS NFR BLD AUTO: 0.8 % (ref 0–1.1)
DEPRECATED RDW RBC AUTO: 40.3 FL (ref 37–49)
EOSINOPHIL # BLD AUTO: 0.1 10*3/MM3 (ref 0–0.36)
EOSINOPHIL NFR BLD AUTO: 1.6 % (ref 1–5)
ERYTHROCYTE [DISTWIDTH] IN BLOOD BY AUTOMATED COUNT: 12.4 % (ref 11.7–14.5)
HCT VFR BLD AUTO: 38.8 % (ref 34–45)
HGB BLD-MCNC: 13 G/DL (ref 11.5–14.9)
IMM GRANULOCYTES # BLD: 0.03 10*3/MM3 (ref 0–0.03)
IMM GRANULOCYTES NFR BLD: 0.5 % (ref 0–0.5)
LYMPHOCYTES # BLD AUTO: 1.25 10*3/MM3 (ref 1–3.5)
LYMPHOCYTES NFR BLD AUTO: 20.4 % (ref 20–49)
MCH RBC QN AUTO: 29.9 PG (ref 27–33)
MCHC RBC AUTO-ENTMCNC: 33.5 G/DL (ref 32–35)
MCV RBC AUTO: 89.2 FL (ref 83–97)
MONOCYTES # BLD AUTO: 0.45 10*3/MM3 (ref 0.25–0.8)
MONOCYTES NFR BLD AUTO: 7.3 % (ref 4–12)
NEUTROPHILS # BLD AUTO: 4.26 10*3/MM3 (ref 1.5–7)
NEUTROPHILS NFR BLD AUTO: 69.4 % (ref 39–75)
NRBC BLD MANUAL-RTO: 0 /100 WBC (ref 0–0)
PLATELET # BLD AUTO: 223 10*3/MM3 (ref 150–375)
PMV BLD AUTO: 10.3 FL (ref 8.9–12.1)
RBC # BLD AUTO: 4.35 10*6/MM3 (ref 3.9–5)
WBC NRBC COR # BLD: 6.14 10*3/MM3 (ref 4–10)

## 2018-02-08 PROCEDURE — 99214 OFFICE O/P EST MOD 30 MIN: CPT | Performed by: INTERNAL MEDICINE

## 2018-02-08 PROCEDURE — 85025 COMPLETE CBC W/AUTO DIFF WBC: CPT | Performed by: INTERNAL MEDICINE

## 2018-02-08 PROCEDURE — 36415 COLL VENOUS BLD VENIPUNCTURE: CPT | Performed by: INTERNAL MEDICINE

## 2018-02-08 NOTE — PROGRESS NOTES
Subjective    REASONS FOR FOLLOWUP:   1. 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   2. 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   3. Primary radiation to left lung bronchoalveolar carcinoma given in 2013.   4. EGFR mutation negative in the left upper lobe cancer.   5. Multinodular goiter.          History of Present Illness patient is an 86-year-old female with 2 separate lung cancers treated with surgery on 1 occasion and radiation on the other most recent one being in 2013.  She comes in for follow-up today doing fairly well.  Her cough is actually improved.  Weight stable appetite is stable she has no new complaints  Her brother  last month and she still very upset about this.  Otherwise she is doing well with no significant issues.  CAT scan of the chest was reviewed and essentially stable for the last year and a half.  We have decided to move to annual CAT scans because of her age and stability of imaging-for the time being she still interested in treatment for new malignancy provided his noninvasive such as radiation          PAST MEDICAL HISTORY: Significant for type 2 diabetes, a leaky mitral valve, history of positive PPD, history of multinodular goiter with a thyroid needle biopsy, which was negative, history of osteoarthritis, hypertension and hypercholesterolemia and is on medication for this , and she has some reflux type symptoms.     PAST SURGICAL HISTORY: Hysterectomy in , benign cyst removed from the vocal cord in , cataract surgery and right upper lobectomy in 2002.       OB/GYN HISTORY:  1, para 1.     ONCOLOGIC HISTORY: History from previous dates can be found in the separate document.   ONCOLOGIC HISTORY: Well differentiated adenocarcinoma left upper lobe measuring 2 x 2 x 1 cm, negative lymph nodes, T1N0 post right upper lobectomy. No adjuvant treatment.   The patient is an 82-year-old Chinese lady with  history of T1N0 well-differentiated adenocarcinoma of the right upper lobe, treated with lobectomy in June 2002 who was followed perspectively with scans and in 2008 was noted to have a small abnormality in t he left lung which was described as ground glass opacity. This remained unchanged through 2010, but in April 2012 CT of the chest showed the ground glass opacity in the perihilar region appeared slightly larger and more confluent than the previous scan i n 2010 and the curvilinear area of patchy focal density in the left apex that was new, which was felt to be scarring. There was no adenopathy appreciated. This led to a bronchoscopy by Dr. Tha Brandt in June 2012, which was nondiagnostic. She was also referred to a gastroenterologist because of minimal rectal bleeding and underwent a colonoscopy, which was unremarkable. Followup chest CT on 10/17/12 without contrast at [facility] showed further enlargement of the lingular infiltrate to 2.3 x 2.2 cm comp a red to 1.8 x 2.2 in May. Dr. Brandt discussed the situation with her and told her that he did not think she would tolerate surgery because of her age and the previous right upper lobectomy and felt that she may not do well with chemo and radiation either a nd because she wanted another opinion, she visited her son in [place] and went to [facility] where she was seen at the Lung Clinic. She was presented to their Tumor Board and they recommended that she have a CT with contrast and CT guided biopsy of the are a before making any decisions and repeat PFTs to see if she would be a surgical candidate. The patient proceeded with a CT on 12/27/12 and PFTs and decided to see me for a third opinion regarding her cancer and treatment options.   The patient was present ed at thoracic clinic and CT guided biopsy was felt to be the best approach as they did not think EBUS or navigational bronchoscopy would yield a diagnosis. The radiologist felt this was feasible and was  performed 1/7/13 with the findings of a well diffe r entiated adenocarcinoma. Pre-biopsy PET scan showed very low level uptake in the lesion and no adenopathy. The patient was not felt to be a surgical candidate because of her age and previous right upper lobectomy and focused radiation was felt to be her best option. The patient had been to [facility] for a second opinion and they also concurred with this approach. EGFR mutation status is not known but ordered because of her ethnic origin and the fact that if she has recurrence of disease after radiation, thi s could be a consideration of therapy.   The patient is EGFR mutation negative.   Patient treated with primary radiation to the left lung carcinoma in March. CAT scans dated 5/22/13 showed ground-glass opacity in the left hilar region shrunk from 3.1 x 3.1 to 2.2 x 2.   The patient was seen on 02/17/2014 with a repeat CT scan of the chest and abdomen, dated 02/10/2014 that shows a multinodular goiter, which is unchanged, and further resolution of the radiation changes with no definite mass, and the abdomen is benign.   CAT scans dated 8/2014 shows persistent finding of chronic infiltrates stable since February. No signs of recurrent cancer (no contrast given).   The patient was seen 02/05/2015 with a CAT scan that shows stable radiation changes in the left upper lobe and stable small nodule in the right upper lobe. Continued followup planned.   The patient is seen on 07/30/2015 with a CT scan of chest and abdomen with contrast that showed no evidence of recurrent cancer. There are radiation changes in left l nora with fiducial markers in the region, and previous right upper lobectomy and a stable 6 mm nodule in the right lobe unchanged for over 3 years. Abdomen is benign.   Patient was seen on 02/22/2016 with a CT scan of the chest without contrast that showed a stable 6 mm nodule in the right middle lobe and no other lung nodules.    patient is an 86-year-old female  with remote history of bronchioloalveolar carcinoma in  and a second malignancy in the left lung in 2013 treated with focused radiation.  She comes in today with a 6 month follow-up and scans and overall she is doing well.  She has no weight loss or hemoptysis or pain in the chest.  She has a chronic cough but is not very significant and does not require much treatment.  Appetite and energy are fine.  She continues with mammography and follows with her family doctor for her medical issues routinely.  Thankfully the CAT scan shows stable 6mm  nodule in the right middle lobe since  with no new abnormalities.  Her thyroid goiter is also stable.  She's had a mammogram and this was normal       SOCIAL HISTORY: She is  for the second time. She smoked briefly for 18 years and stopped 50 years ago. She is not a drinker.     FAMILY HISTORY: Father  at age 65 of a blood clot. Mother  at age 86 of a fall. She has 2 brothers and 3 sisters. One sister had breast c ancer at a young age and  of kidney failure and one from complications of schizophrenia at 56. Another sister  of heart problems at age 65. No other cancer in the family. Her son is healthy.     Review of Systems   Respiratory: Positive for cough.       A comprehensive 14 point review of systems was performed and was negative except as mentioned.      Current Outpatient Prescriptions:   •  ACETAMINOPHEN PO, Take  by mouth., Disp: , Rfl:   •  amLODIPine-valsartan (EXFORGE)  MG per tablet, Take 1 tablet by mouth Daily., Disp: , Rfl:   •  Aspirin (ASPIR-81 PO), Take  by mouth daily., Disp: , Rfl:   •  atenolol (TENORMIN) 25 MG tablet, TAKE ONE AND ONE-HALF TABLETS TWICE A DAY (NEED TO SCHEDULE AN APPOINTMENT), Disp: 270 tablet, Rfl: 0  •  Cholecalciferol (VITAMIN D) 1000 UNITS tablet, Take 2 tablets by mouth., Disp: , Rfl:   •  Docusate Sodium (STOOL SOFTENER) 100 MG capsule, Take  by mouth., Disp: , Rfl:   •  fenofibrate (TRICOR) 145  "MG tablet, Take  by mouth daily., Disp: , Rfl:   •  glimepiride (AMARYL) 1 MG tablet, Take  by mouth 2 (Two) Times a Day., Disp: , Rfl:   •  Magnesium 250 MG tablet, Take  by mouth Daily., Disp: , Rfl:   •  rosuvastatin (CRESTOR) 10 MG tablet, Take 10 mg by mouth., Disp: , Rfl:     ALLERGIES:    Allergies   Allergen Reactions   • Contrast Dye    • Iodinated Diagnostic Agents Rash       Objective      Vitals:    02/08/18 1110   BP: 128/58   Pulse: 55   Resp: 12   Temp: 97.6 °F (36.4 °C)   TempSrc: Oral   SpO2: 98%   Weight: 46.3 kg (102 lb)   Height: 151.5 cm (59.65\")   PainSc: 0-No pain     Current Status 2/8/2018   ECOG score 0       Physical Exam    GENERAL:  Well-developed, well-nourished in no acute distress.   SKIN:  Warm, dry without rashes, purpura or petechiae.  HEAD:  Normocephalic.  EYES:  Pupils equal, round and reactive to light.  EOMs intact.  Conjunctivae normal.  EARS:  Hearing intact.  NOSE:  Septum midline.  No excoriations or nasal discharge.  MOUTH:  Tongue is well-papillated; no stomatitis or ulcers.  Lips normal.  THROAT:  Oropharynx without lesions or exudates.  Moderate thyromegaly is stable  NECK:  Supple with good range of motion; thyromegaly noted no  masses, no JVD.  LYMPHATICS:  No cervical, supraclavicular, axillary or inguinal adenopathy.  CHEST:  Lungs clear to percussion and auscultation. Good airflow.  CARDIAC:  Regular rate and rhythm without murmurs, rubs or gallops. Normal S1,S2.  ABDOMEN:  Soft, nontender with no organomegaly or masses.  EXTREMITIES:  No clubbing, cyanosis or edema.  NEUROLOGICAL:  Cranial Nerves II-XII grossly intact.  No focal neurological deficits.  PSYCHIATRIC:  Normal affect and mood.        RECENT LABS:  Hematology WBC   Date Value Ref Range Status   02/08/2018 6.14 4.00 - 10.00 10*3/mm3 Final     RBC   Date Value Ref Range Status   02/08/2018 4.35 3.90 - 5.00 10*6/mm3 Final     Hemoglobin   Date Value Ref Range Status   02/08/2018 13.0 11.5 - 14.9 g/dL " Final     Hematocrit   Date Value Ref Range Status   02/08/2018 38.8 34.0 - 45.0 % Final     Platelets   Date Value Ref Range Status   02/08/2018 223 150 - 375 10*3/mm3 Final                CT CHEST      1.  The patient has had previous right upper lobectomy for lung cancer.  She has also had radiation therapy for lung cancer in the left lung and  there are postradiation changes with areas of chronic fibrotic scarring  and atelectasis near the fiducial wires in the left midlung and  extending posteriorly near the inferior margin of the left hilum. These  findings are unchanged from 01/11/2017 and also unchanged from  08/01/2016.      2.  Again noted is a 6 mm noncalcified nodule in the posterior aspect of  the right middle lobe near the major fissure. This is unchanged from  01/11/2017 and also unchanged from 08/01/2016. The lungs are otherwise  clear. There are no new nodules.      3.  There is no mediastinal or hilar or axillary adenopathy. There is no  pericardial effusion. There is nodularity and enlargement of both  thyroid lobes, left greater than right, that is unchanged from multiple  CT scans dating back to 2013 and consistent with multinodular goiter.      4. At bone windows, no suspicious bone lesions are seen.        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   3.primary radiation to left lung bronchoalveolar carcinoma given in March 2013.   4.EGFR mutation negative in the left upper lobe cancer.   5.Multinodular goiter.     Plan  Return in 6 months with chest x-ray and we will move to annual CAT scans    2/8/2018      CC:

## 2018-04-02 RX ORDER — ATENOLOL 25 MG/1
37.5 TABLET ORAL 2 TIMES DAILY
Qty: 270 TABLET | Refills: 1 | Status: SHIPPED | OUTPATIENT
Start: 2018-04-02 | End: 2018-10-01 | Stop reason: SDUPTHER

## 2018-07-11 ENCOUNTER — TRANSCRIBE ORDERS (OUTPATIENT)
Dept: ADMINISTRATIVE | Facility: HOSPITAL | Age: 83
End: 2018-07-11

## 2018-07-11 DIAGNOSIS — E04.1 THYROID NODULE: Primary | ICD-10-CM

## 2018-07-18 ENCOUNTER — OFFICE VISIT (OUTPATIENT)
Dept: CARDIOLOGY | Facility: CLINIC | Age: 83
End: 2018-07-18

## 2018-07-18 VITALS
HEIGHT: 59 IN | HEART RATE: 58 BPM | SYSTOLIC BLOOD PRESSURE: 138 MMHG | WEIGHT: 102 LBS | DIASTOLIC BLOOD PRESSURE: 78 MMHG | BODY MASS INDEX: 20.56 KG/M2

## 2018-07-18 DIAGNOSIS — I10 ESSENTIAL HYPERTENSION: Primary | ICD-10-CM

## 2018-07-18 DIAGNOSIS — I34.0 NON-RHEUMATIC MITRAL REGURGITATION: ICD-10-CM

## 2018-07-18 PROBLEM — E78.2 MIXED HYPERLIPIDEMIA: Status: ACTIVE | Noted: 2018-07-18

## 2018-07-18 PROBLEM — E11.9 TYPE 2 DIABETES MELLITUS WITHOUT COMPLICATION (HCC): Status: ACTIVE | Noted: 2018-07-18

## 2018-07-18 PROCEDURE — 99214 OFFICE O/P EST MOD 30 MIN: CPT | Performed by: INTERNAL MEDICINE

## 2018-07-18 PROCEDURE — 93000 ELECTROCARDIOGRAM COMPLETE: CPT | Performed by: INTERNAL MEDICINE

## 2018-07-18 RX ORDER — AMLODIPINE BESYLATE 10 MG/1
1 TABLET ORAL DAILY
COMMUNITY

## 2018-07-18 RX ORDER — LOSARTAN POTASSIUM 100 MG/1
1 TABLET ORAL DAILY
COMMUNITY
End: 2021-05-14

## 2018-07-18 NOTE — PROGRESS NOTES
Date of Office Visit: 2018  Encounter Provider: Moe Heredia MD  Place of Service: [facility] CARDIOLOGY  Patient Name: Gael Tarango  :1930      Chief Complaint   Patient presents with   • Cardiac Valve Problem     History of Present Illness    The patient is an 87-year-old female with hypertension as well as mitral regurgitation.  She returns to the office today for follow-up.  In general she feels well.  She does not complain of any lightheadedness or dizziness.  She denies orthopnea or paroxysmal nocturnal dyspnea.  She has no complaints of lower extremity edema.    Past Medical History:   Diagnosis Date   • Arthritis    • Diabetes mellitus (CMS/HCC)     Type 2   • Disease of thyroid gland    • Goiter     Multinodular   • Lung cancer (CMS/HCC)     Right lung, stage IA bronchoalveolar carcinoma   • Lung cancer (CMS/HCC)     Left lung T1N0, well differentiated bronchoalveolar carcinoma   • Mitral valve regurgitation    • 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     Positive PPD   • Vocal cord cyst     Removed         Past Surgical History:   Procedure Laterality Date   • CATARACT EXTRACTION Bilateral    • HYSTERECTOMY     • LUNG LOBECTOMY Right 2002   • VOCAL CORD BIOPSY  1972    Benign cyst removed           Current Outpatient Prescriptions:   •  ACETAMINOPHEN PO, Take  by mouth., Disp: , Rfl:   •  amLODIPine (NORVASC) 10 MG tablet, Take 1 tablet by mouth Daily., Disp: , Rfl:   •  Aspirin (ASPIR-81 PO), Take  by mouth daily., Disp: , Rfl:   •  atenolol (TENORMIN) 25 MG tablet, Take 1.5 tablets by mouth 2 (Two) Times a Day., Disp: 270 tablet, Rfl: 1  •  Cholecalciferol (VITAMIN D) 1000 UNITS tablet, Take 2 tablets by mouth., Disp: , Rfl:   •  Docusate Sodium (STOOL SOFTENER) 100 MG capsule, Take  by mouth., Disp: , Rfl:   •  fenofibrate (TRICOR) 145 MG tablet, Take  by mouth daily., Disp: , Rfl:   •  glimepiride (AMARYL) 1 MG tablet, Take  by mouth 3 (Three)  "Times a Day., Disp: , Rfl:   •  losartan (COZAAR) 100 MG tablet, Take 1 tablet by mouth Daily., Disp: , Rfl:   •  Magnesium 250 MG tablet, Take  by mouth Daily., Disp: , Rfl:   •  rosuvastatin (CRESTOR) 10 MG tablet, Take 10 mg by mouth., Disp: , Rfl:       Social History     Social History   • Marital status:      Spouse name: N/A   • Number of children: N/A   • Years of education: College     Occupational History   • Teacher Retired     [employer]     Social History Main Topics   • Smoking status: Former Smoker     Packs/day: 1.00     Years: 18.00     Types: Cigarettes   • Smokeless tobacco: Never Used   • Alcohol use No   • Drug use: No   • Sexual activity: Not on file     Other Topics Concern   • Not on file     Social History Narrative   • No narrative on file         Review of Systems   Constitution: Negative.   HENT: Negative.    Eyes: Negative.    Cardiovascular: Negative.    Respiratory: Negative.    Endocrine: Negative.    Skin: Negative.    Musculoskeletal: Negative.    Gastrointestinal: Negative.    Neurological: Negative.    Psychiatric/Behavioral: Negative.        Procedures      ECG 12 Lead  Date/Time: 7/18/2018 10:56 AM  Performed by: BARTOLO MENDEZ  Authorized by: BARTOLO MENDEZ   Comparison: compared with previous ECG from 1/11/2018  Similar to previous ECG  Rhythm: sinus rhythm  Rate: normal  Conduction: non-specific intraventricular conduction delay  QRS axis: normal                  Objective:    /78   Pulse 58   Ht 149.9 cm (59\")   Wt 46.3 kg (102 lb)   BMI 20.60 kg/m²         Physical Exam   Constitutional: She is oriented to person, place, and time. She appears well-developed and well-nourished.   HENT:   Head: Normocephalic.   Eyes: Pupils are equal, round, and reactive to light.   Neck: Normal range of motion. No JVD present. Carotid bruit is not present. No thyromegaly present.   Cardiovascular: Normal rate, regular rhythm, S1 normal, S2 normal and intact distal " pulses.  Exam reveals no gallop and no friction rub.    Murmur heard.  High-pitched blowing holosystolic murmur is present with a grade of 2/6  at the apex  Pulmonary/Chest: Effort normal and breath sounds normal.   Abdominal: Soft. Bowel sounds are normal.   Musculoskeletal: She exhibits no edema.   Neurological: She is alert and oriented to person, place, and time.   Skin: Skin is warm, dry and intact. No erythema.   Psychiatric: She has a normal mood and affect.   Vitals reviewed.          Assessment:       Diagnosis Plan   1. Essential hypertension     2. Non-rheumatic mitral regurgitation     3.  Diabetes mellitus, type II: On oral therapy  4.  Hyperlipidemia: On medical therapy  5.  History of lung cancer         Plan:     Change in medication at this time.  The patient will follow back up in 6 months.

## 2018-07-25 ENCOUNTER — HOSPITAL ENCOUNTER (OUTPATIENT)
Dept: ULTRASOUND IMAGING | Facility: HOSPITAL | Age: 83
Discharge: HOME | End: 2018-07-25
Admitting: INTERNAL MEDICINE

## 2018-07-25 DIAGNOSIS — E04.1 THYROID NODULE: ICD-10-CM

## 2018-07-25 PROCEDURE — 76536 US EXAM OF HEAD AND NECK: CPT

## 2018-08-09 ENCOUNTER — LAB (OUTPATIENT)
Dept: LAB | Facility: HOSPITAL | Age: 83
End: 2018-08-09

## 2018-08-09 ENCOUNTER — OFFICE VISIT (OUTPATIENT)
Dept: ONCOLOGY | Facility: CLINIC | Age: 83
End: 2018-08-09

## 2018-08-09 ENCOUNTER — APPOINTMENT (OUTPATIENT)
Dept: GENERAL RADIOLOGY | Facility: HOSPITAL | Age: 83
End: 2018-08-09

## 2018-08-09 VITALS
WEIGHT: 101.6 LBS | OXYGEN SATURATION: 99 % | SYSTOLIC BLOOD PRESSURE: 152 MMHG | RESPIRATION RATE: 16 BRPM | DIASTOLIC BLOOD PRESSURE: 60 MMHG | HEART RATE: 49 BPM | TEMPERATURE: 97.9 F | BODY MASS INDEX: 19.94 KG/M2 | HEIGHT: 60 IN

## 2018-08-09 DIAGNOSIS — C34.2 MALIGNANT NEOPLASM OF MIDDLE LOBE OF RIGHT LUNG (HCC): Primary | ICD-10-CM

## 2018-08-09 DIAGNOSIS — C34.2 MALIGNANT NEOPLASM OF MIDDLE LOBE OF RIGHT LUNG (HCC): ICD-10-CM

## 2018-08-09 LAB
BASOPHILS # BLD AUTO: 0.07 10*3/MM3 (ref 0–0.1)
BASOPHILS NFR BLD AUTO: 1.2 % (ref 0–1.1)
DEPRECATED RDW RBC AUTO: 41.1 FL (ref 37–49)
EOSINOPHIL # BLD AUTO: 0.09 10*3/MM3 (ref 0–0.36)
EOSINOPHIL NFR BLD AUTO: 1.6 % (ref 1–5)
ERYTHROCYTE [DISTWIDTH] IN BLOOD BY AUTOMATED COUNT: 12.7 % (ref 11.7–14.5)
HCT VFR BLD AUTO: 38.5 % (ref 34–45)
HGB BLD-MCNC: 12.5 G/DL (ref 11.5–14.9)
IMM GRANULOCYTES # BLD: 0.03 10*3/MM3 (ref 0–0.03)
IMM GRANULOCYTES NFR BLD: 0.5 % (ref 0–0.5)
LYMPHOCYTES # BLD AUTO: 1.34 10*3/MM3 (ref 1–3.5)
LYMPHOCYTES NFR BLD AUTO: 23.3 % (ref 20–49)
MCH RBC QN AUTO: 28.6 PG (ref 27–33)
MCHC RBC AUTO-ENTMCNC: 32.5 G/DL (ref 32–35)
MCV RBC AUTO: 88.1 FL (ref 83–97)
MONOCYTES # BLD AUTO: 0.86 10*3/MM3 (ref 0.25–0.8)
MONOCYTES NFR BLD AUTO: 15 % (ref 4–12)
NEUTROPHILS # BLD AUTO: 3.35 10*3/MM3 (ref 1.5–7)
NEUTROPHILS NFR BLD AUTO: 58.4 % (ref 39–75)
NRBC BLD MANUAL-RTO: 0 /100 WBC (ref 0–0)
PLATELET # BLD AUTO: 279 10*3/MM3 (ref 150–375)
PMV BLD AUTO: 9.9 FL (ref 8.9–12.1)
RBC # BLD AUTO: 4.37 10*6/MM3 (ref 3.9–5)
WBC NRBC COR # BLD: 5.74 10*3/MM3 (ref 4–10)

## 2018-08-09 PROCEDURE — 99214 OFFICE O/P EST MOD 30 MIN: CPT | Performed by: INTERNAL MEDICINE

## 2018-08-09 PROCEDURE — 36416 COLLJ CAPILLARY BLOOD SPEC: CPT | Performed by: INTERNAL MEDICINE

## 2018-08-09 PROCEDURE — 71046 X-RAY EXAM CHEST 2 VIEWS: CPT

## 2018-08-09 PROCEDURE — 85025 COMPLETE CBC W/AUTO DIFF WBC: CPT | Performed by: INTERNAL MEDICINE

## 2018-08-09 NOTE — PROGRESS NOTES
Subjective    REASONS FOR FOLLOWUP:   1. 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   2. 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   3. Primary radiation to left lung bronchoalveolar carcinoma given in March 2013.   4. EGFR mutation negative in the left upper lobe cancer.   5. Multinodular goiter.          History of Present Illness patient is an 86-year-old female with 2 separate lung cancers treated with surgery on 1 occasion and radiation on the other most recent one being in March 2013.  She comes in for follow-up today doing fairly well.      Weight and appetite are stable but her cough is getting a little more problematic especially at night and interfering with her sleep     She is taking a quarter teaspoon of her cough medicine at bedtime when her cough is very bad and this immediately helps the cough and helps her sleep well and I've recommended she take a quarter spoon every night to prevent a cough and give her more rest.  She is scared to do this because she is scared she will get addicted to the medication and I told her this was not a valid concern        We have decided to move to annual CAT scans because of her age and stability of imaging-for the time being she still interested in treatment for new malignancy provided his noninvasive such as radiation        Active Ambulatory Problems     Diagnosis Date Noted   • Essential hypertension 03/29/2017   • Non-rheumatic mitral regurgitation 03/29/2017   • Malignant neoplasm of middle lobe of right lung (CMS/HCC) 08/25/2017   • Type 2 diabetes mellitus without complication (CMS/HCC) 07/18/2018   • Mixed hyperlipidemia 07/18/2018     Resolved Ambulatory Problems     Diagnosis Date Noted   • No Resolved Ambulatory Problems     Past Medical History:   Diagnosis Date   • Arthritis    • Diabetes mellitus (CMS/HCC)    • Disease of thyroid gland    • Goiter    • Lung cancer (CMS/HCC) 2002   • Lung cancer  (CMS/HCC)    • Mitral valve regurgitation    • 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    • Vocal cord cyst        Past Surgical History:   Procedure Laterality Date   • CATARACT EXTRACTION Bilateral    • HYSTERECTOMY  1970   • LUNG LOBECTOMY Right 2002   • VOCAL CORD BIOPSY      Benign cyst removed     PAST SURGICAL HISTORY: Hysterectomy in , benign cyst removed from the vocal cord in , cataract surgery and right upper lobectomy in 2002.       OB/GYN HISTORY:  1, para 1.     ONCOLOGIC HISTORY: History from previous dates can be found in the separate document.   ONCOLOGIC HISTORY: Well differentiated adenocarcinoma left upper lobe measuring 2 x 2 x 1 cm, negative lymph nodes, T1N0 post right upper lobectomy. No adjuvant treatment.   The patient is an 82-year-old Chinese lady with history of T1N0 well-differentiated adenocarcinoma of the right upper lobe, treated with lobectomy in 2002 who was followed perspectively with scans and in  was noted to have a small abnormality in t he left lung which was described as ground glass opacity. This remained unchanged through , but in 2012 CT of the chest showed the ground glass opacity in the perihilar region appeared slightly larger and more confluent than the previous scan i n  and the curvilinear area of patchy focal density in the left apex that was new, which was felt to be scarring. There was no adenopathy appreciated. This led to a bronchoscopy by Dr. Tha Brandt in 2012, which was nondiagnostic. She was also referred to a gastroenterologist because of minimal rectal bleeding and underwent a colonoscopy, which was unremarkable. Followup chest CT on 10/17/12 without contrast at [facility] showed further enlargement of the lingular infiltrate to 2.3 x 2.2 cm comp a red to 1.8 x 2.2 in May. Dr. Brandt discussed the situation with her and told her that he did not think she would tolerate surgery because of her age and the previous  right upper lobectomy and felt that she may not do well with chemo and radiation either a nd because she wanted another opinion, she visited her son in [place] and went to [facility] where she was seen at the Lung Clinic. She was presented to their Tumor Board and they recommended that she have a CT with contrast and CT guided biopsy of the are a before making any decisions and repeat PFTs to see if she would be a surgical candidate. The patient proceeded with a CT on 12/27/12 and PFTs and decided to see me for a third opinion regarding her cancer and treatment options.   The patient was present ed at thoracic clinic and CT guided biopsy was felt to be the best approach as they did not think EBUS or navigational bronchoscopy would yield a diagnosis. The radiologist felt this was feasible and was performed 1/7/13 with the findings of a well diffe r entiated adenocarcinoma. Pre-biopsy PET scan showed very low level uptake in the lesion and no adenopathy. The patient was not felt to be a surgical candidate because of her age and previous right upper lobectomy and focused radiation was felt to be her best option. The patient had been to [facility] for a second opinion and they also concurred with this approach. EGFR mutation status is not known but ordered because of her ethnic origin and the fact that if she has recurrence of disease after radiation, thi s could be a consideration of therapy.   The patient is EGFR mutation negative.   Patient treated with primary radiation to the left lung carcinoma in March. CAT scans dated 5/22/13 showed ground-glass opacity in the left hilar region shrunk from 3.1 x 3.1 to 2.2 x 2.   The patient was seen on 02/17/2014 with a repeat CT scan of the chest and abdomen, dated 02/10/2014 that shows a multinodular goiter, which is unchanged, and further resolution of the radiation changes with no definite mass, and the abdomen is benign.   CAT scans dated 8/2014 shows persistent finding of chronic  infiltrates stable since February. No signs of recurrent cancer (no contrast given).   The patient was seen 2015 with a CAT scan that shows stable radiation changes in the left upper lobe and stable small nodule in the right upper lobe. Continued followup planned.   The patient is seen on 2015 with a CT scan of chest and abdomen with contrast that showed no evidence of recurrent cancer. There are radiation changes in left l nora with fiducial markers in the region, and previous right upper lobectomy and a stable 6 mm nodule in the right lobe unchanged for over 3 years. Abdomen is benign.   Patient was seen on 2016 with a CT scan of the chest without contrast that showed a stable 6 mm nodule in the right middle lobe and no other lung nodules.    patient is an 86-year-old female with remote history of bronchioloalveolar carcinoma in  and a second malignancy in the left lung in  treated with focused radiation.  She comes in today with a 6 month follow-up and scans and overall she is doing well.  She has no weight loss or hemoptysis or pain in the chest.  She has a chronic cough but is not very significant and does not require much treatment.  Appetite and energy are fine.  She continues with mammography and follows with her family doctor for her medical issues routinely.  Thankfully the CAT scan shows stable 6mm  nodule in the right middle lobe since  with no new abnormalities.  Her thyroid goiter is also stable.  She's had a mammogram and this was normal       SOCIAL HISTORY: She is  for the second time. She smoked briefly for 18 years and stopped 50 years ago. She is not a drinker.     FAMILY HISTORY: Father  at age 65 of a blood clot. Mother  at age 86 of a fall. She has 2 brothers and 3 sisters. One sister had breast c ancer at a young age and  of kidney failure and one from complications of schizophrenia at 56. Another sister  of heart problems at age 65. No  "other cancer in the family. Her son is healthy.     Review of Systems   Respiratory: Positive for cough.       A comprehensive 14 point review of systems was performed and was negative except as mentioned.      Current Outpatient Prescriptions:   •  ACETAMINOPHEN PO, Take  by mouth., Disp: , Rfl:   •  amLODIPine (NORVASC) 10 MG tablet, Take 1 tablet by mouth Daily., Disp: , Rfl:   •  Aspirin (ASPIR-81 PO), Take  by mouth daily., Disp: , Rfl:   •  atenolol (TENORMIN) 25 MG tablet, Take 1.5 tablets by mouth 2 (Two) Times a Day., Disp: 270 tablet, Rfl: 1  •  Cholecalciferol (VITAMIN D) 1000 UNITS tablet, Take 2 tablets by mouth., Disp: , Rfl:   •  Docusate Sodium (STOOL SOFTENER) 100 MG capsule, Take  by mouth., Disp: , Rfl:   •  fenofibrate (TRICOR) 145 MG tablet, Take  by mouth daily., Disp: , Rfl:   •  glimepiride (AMARYL) 1 MG tablet, Take  by mouth 3 (Three) Times a Day., Disp: , Rfl:   •  losartan (COZAAR) 100 MG tablet, Take 1 tablet by mouth Daily., Disp: , Rfl:   •  Magnesium 250 MG tablet, Take  by mouth Daily., Disp: , Rfl:   •  rosuvastatin (CRESTOR) 10 MG tablet, Take 10 mg by mouth., Disp: , Rfl:   •  HYDROcodone-homatropine (HYCODAN) 5-1.5 MG/5ML syrup, Take 5 mL by mouth Every 6 (Six) Hours As Needed for Cough., Disp: 240 mL, Rfl: 0    ALLERGIES:    Allergies   Allergen Reactions   • Contrast Dye    • Iodinated Diagnostic Agents Rash       Objective      Vitals:    08/09/18 1125   BP: 152/60   Pulse: (!) 49   Resp: 16   Temp: 97.9 °F (36.6 °C)   TempSrc: Oral   SpO2: 99%   Weight: 46.1 kg (101 lb 9.6 oz)   Height: 152 cm (59.84\")   PainSc: 0-No pain     Current Status 8/9/2018   ECOG score 0       Physical Exam    GENERAL:  Well-developed, well-nourished in no acute distress.   SKIN:  Warm, dry without rashes, purpura or petechiae.  HEAD:  Normocephalic.  EYES:  Pupils equal, round and reactive to light.  EOMs intact.  Conjunctivae normal.  EARS:  Hearing intact.  NOSE:  Septum midline.  No excoriations " or nasal discharge.  MOUTH:  Tongue is well-papillated; no stomatitis or ulcers.  Lips normal.  THROAT:  Oropharynx without lesions or exudates.  Moderate thyromegaly is stable  NECK:  Supple with good range of motion; thyromegaly noted no  masses, no JVD.  LYMPHATICS:  No cervical, supraclavicular, axillary or inguinal adenopathy.  CHEST:  Lungs clear to percussion and auscultation. Good airflow.  CARDIAC:  Regular rate and rhythm without murmurs, rubs or gallops. Normal S1,S2.  ABDOMEN:  Soft, nontender with no organomegaly or masses.  EXTREMITIES:  No clubbing, cyanosis or edema.  NEUROLOGICAL:  Cranial Nerves II-XII grossly intact.  No focal neurological deficits.  PSYCHIATRIC:  Normal affect and mood.        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   3.primary radiation to left lung bronchoalveolar carcinoma given in March 2013.   4.EGFR mutation negative in the left upper lobe cancer.   5.Multinodular goiter.     Plan  Return in 6 months with  CAT scans  Hydromet half teaspoon at bedtime daily at bedtime-I have refilled a prescription for 240 mL today  8/9/2018      CC:

## 2018-10-01 RX ORDER — ATENOLOL 25 MG/1
37.5 TABLET ORAL 2 TIMES DAILY
Qty: 270 TABLET | Refills: 1 | Status: SHIPPED | OUTPATIENT
Start: 2018-10-01 | End: 2019-03-30 | Stop reason: SDUPTHER

## 2019-02-01 ENCOUNTER — APPOINTMENT (OUTPATIENT)
Dept: CT IMAGING | Facility: HOSPITAL | Age: 84
End: 2019-02-01
Attending: INTERNAL MEDICINE

## 2019-02-06 ENCOUNTER — HOSPITAL ENCOUNTER (OUTPATIENT)
Dept: CT IMAGING | Facility: HOSPITAL | Age: 84
Discharge: HOME | End: 2019-02-06
Attending: INTERNAL MEDICINE | Admitting: INTERNAL MEDICINE

## 2019-02-06 DIAGNOSIS — C34.2 MALIGNANT NEOPLASM OF MIDDLE LOBE OF RIGHT LUNG (HCC): ICD-10-CM

## 2019-02-06 PROCEDURE — 71250 CT THORAX DX C-: CPT

## 2019-02-21 ENCOUNTER — OFFICE VISIT (OUTPATIENT)
Dept: CARDIOLOGY | Facility: CLINIC | Age: 84
End: 2019-02-21

## 2019-02-21 VITALS
DIASTOLIC BLOOD PRESSURE: 76 MMHG | SYSTOLIC BLOOD PRESSURE: 136 MMHG | BODY MASS INDEX: 20.4 KG/M2 | HEIGHT: 59 IN | OXYGEN SATURATION: 98 % | WEIGHT: 101.2 LBS | HEART RATE: 57 BPM

## 2019-02-21 DIAGNOSIS — I10 ESSENTIAL HYPERTENSION: ICD-10-CM

## 2019-02-21 DIAGNOSIS — I34.0 NON-RHEUMATIC MITRAL REGURGITATION: Primary | ICD-10-CM

## 2019-02-21 PROCEDURE — 93000 ELECTROCARDIOGRAM COMPLETE: CPT | Performed by: INTERNAL MEDICINE

## 2019-02-21 PROCEDURE — 99214 OFFICE O/P EST MOD 30 MIN: CPT | Performed by: INTERNAL MEDICINE

## 2019-02-21 NOTE — PROGRESS NOTES
Date of Office Visit: 2019  Encounter Provider: oMe Heredia MD  Place of Service: [facility] CARDIOLOGY  Patient Name: Gael Tarango  :1930      Chief Complaint   Patient presents with   • Hypertension     6 mos follow up     History of Present Illness    The patient is an 88-year-old female with a history of mitral regurgitation who returns to the office today for follow-up.  She is feeling well without any major complaints or difficulties at this time.  She also has a history of hypertension that appears to be under very good control.    Symptomatically the patient only complains of some fatigue issues but denies palpitations or shortness of breath.  She has no chest discomfort.    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 (Patient taking differently: Take 25 mg by mouth 3 (Three) Times a Day.), Disp: 270 tablet, Rfl: 1  •  Cholecalciferol (VITAMIN D) 1000  UNITS tablet, Take 2 tablets by mouth., Disp: , Rfl:   •  Docusate Sodium (STOOL SOFTENER) 100 MG capsule, Take  by mouth., Disp: , Rfl:   •  fenofibrate (TRICOR) 145 MG tablet, Take  by mouth daily., Disp: , Rfl:   •  glimepiride (AMARYL) 1 MG tablet, Take  by mouth 3 (Three) Times a Day., Disp: , Rfl:   •  HYDROcodone-homatropine (HYCODAN) 5-1.5 MG/5ML syrup, Take 5 mL by mouth Every 6 (Six) Hours As Needed for Cough., Disp: 240 mL, Rfl: 0  •  losartan (COZAAR) 100 MG tablet, Take 1 tablet by mouth Daily., Disp: , Rfl:   •  Magnesium 250 MG tablet, Take  by mouth Daily., Disp: , Rfl:   •  rosuvastatin (CRESTOR) 10 MG tablet, Take 10 mg by mouth., Disp: , Rfl:       Social History     Socioeconomic History   • Marital status:      Spouse name: Not on file   • Number of children: Not on file   • Years of education: College   • Highest education level: Not on file   Social Needs   • Financial resource strain: Not on file   • Food insecurity - worry: Not on file   • Food insecurity - inability: Not on file   • Transportation needs - medical: Not on file   • Transportation needs - non-medical: Not on file   Occupational History   • Occupation: Teacher     Employer: RETIRED     Comment: [employer]   Tobacco Use   • Smoking status: Former Smoker     Packs/day: 1.00     Years: 18.00     Pack years: 18.00     Types: Cigarettes   • Smokeless tobacco: Never Used   • Tobacco comment: caffeine use   Substance and Sexual Activity   • Alcohol use: No   • Drug use: No   • Sexual activity: Not on file   Other Topics Concern   • Not on file   Social History Narrative   • Not on file         Review of Systems   Constitution: Positive for malaise/fatigue.   HENT: Negative.    Eyes: Negative.    Cardiovascular: Negative.    Respiratory: Negative.    Endocrine: Negative.    Skin: Negative.    Musculoskeletal: Negative.    Gastrointestinal: Negative.    Neurological: Negative.    Psychiatric/Behavioral: Negative.   "      Procedures      ECG 12 Lead  Date/Time: 2/21/2019 11:22 AM  Performed by: Moe Heredia MD  Authorized by: Moe Heredia MD   Comparison: compared with previous ECG from 7/18/2018  Similar to previous ECG  Rhythm: sinus rhythm  Rate: normal  Conduction: non-specific intraventricular conduction delay  QRS axis: normal                  Objective:    /76 (BP Location: Left arm, Patient Position: Sitting, Cuff Size: Adult)   Pulse 57   Ht 149.9 cm (59\")   Wt 45.9 kg (101 lb 3.2 oz)   SpO2 98%   BMI 20.44 kg/m²         Physical Exam   Constitutional: She is oriented to person, place, and time. She appears well-developed and well-nourished.   HENT:   Head: Normocephalic.   Eyes: Pupils are equal, round, and reactive to light.   Neck: Normal range of motion. No JVD present. Carotid bruit is not present. No thyromegaly present.   Cardiovascular: Normal rate, regular rhythm, S1 normal, S2 normal and intact distal pulses. Exam reveals no gallop and no friction rub.   Murmur heard.  High-pitched blowing holosystolic murmur is present with a grade of 1/6 at the apex.  Pulses:       Carotid pulses are 2+ on the right side, and 2+ on the left side.       Dorsalis pedis pulses are 2+ on the right side, and 2+ on the left side.        Posterior tibial pulses are 2+ on the right side, and 2+ on the left side.   Pulmonary/Chest: Effort normal and breath sounds normal.   Abdominal: Soft. Bowel sounds are normal.   Musculoskeletal: She exhibits no edema.   Neurological: She is alert and oriented to person, place, and time.   Skin: Skin is warm, dry and intact. No erythema.   Psychiatric: She has a normal mood and affect.   Vitals reviewed.          Assessment:       Diagnosis Plan   1. Non-rheumatic mitral regurgitation     2. Essential hypertension       1.  Mitral regurgitation: Stable  2.  Hypertension: Controlled       Plan:   No change in medications.  The patient will follow-up in a year    "

## 2019-02-22 ENCOUNTER — LAB (OUTPATIENT)
Dept: LAB | Facility: HOSPITAL | Age: 84
End: 2019-02-22

## 2019-02-22 ENCOUNTER — OFFICE VISIT (OUTPATIENT)
Dept: ONCOLOGY | Facility: CLINIC | Age: 84
End: 2019-02-22

## 2019-02-22 VITALS
DIASTOLIC BLOOD PRESSURE: 62 MMHG | SYSTOLIC BLOOD PRESSURE: 152 MMHG | BODY MASS INDEX: 19.75 KG/M2 | RESPIRATION RATE: 16 BRPM | TEMPERATURE: 97.8 F | HEART RATE: 56 BPM | OXYGEN SATURATION: 99 % | HEIGHT: 60 IN | WEIGHT: 100.6 LBS

## 2019-02-22 DIAGNOSIS — C34.2 MALIGNANT NEOPLASM OF MIDDLE LOBE OF RIGHT LUNG (HCC): Primary | ICD-10-CM

## 2019-02-22 DIAGNOSIS — C34.2 MALIGNANT NEOPLASM OF MIDDLE LOBE OF RIGHT LUNG (HCC): ICD-10-CM

## 2019-02-22 LAB
ALBUMIN SERPL-MCNC: 4.2 G/DL (ref 3.5–5.2)
ALBUMIN/GLOB SERPL: 1.6 G/DL (ref 1.1–2.4)
ALP SERPL-CCNC: 53 U/L (ref 38–116)
ALT SERPL W P-5'-P-CCNC: 19 U/L (ref 0–33)
ANION GAP SERPL CALCULATED.3IONS-SCNC: 10.2 MMOL/L
AST SERPL-CCNC: 27 U/L (ref 0–32)
BASOPHILS # BLD AUTO: 0.03 10*3/MM3 (ref 0–0.2)
BASOPHILS NFR BLD AUTO: 0.6 % (ref 0–1.5)
BILIRUB SERPL-MCNC: 0.3 MG/DL (ref 0.1–1.2)
BUN BLD-MCNC: 25 MG/DL (ref 6–20)
BUN/CREAT SERPL: 27.8 (ref 7.3–30)
CALCIUM SPEC-SCNC: 9.9 MG/DL (ref 8.5–10.2)
CHLORIDE SERPL-SCNC: 102 MMOL/L (ref 98–107)
CO2 SERPL-SCNC: 27.8 MMOL/L (ref 22–29)
CREAT BLD-MCNC: 0.9 MG/DL (ref 0.6–1.1)
DEPRECATED RDW RBC AUTO: 41.4 FL (ref 37–54)
EOSINOPHIL # BLD AUTO: 0.07 10*3/MM3 (ref 0–0.4)
EOSINOPHIL NFR BLD AUTO: 1.5 % (ref 0.3–6.2)
ERYTHROCYTE [DISTWIDTH] IN BLOOD BY AUTOMATED COUNT: 12.4 % (ref 12.3–15.4)
GFR SERPL CREATININE-BSD FRML MDRD: 59 ML/MIN/1.73
GFR SERPL CREATININE-BSD FRML MDRD: 72 ML/MIN/1.73
GLOBULIN UR ELPH-MCNC: 2.7 GM/DL (ref 1.8–3.5)
GLUCOSE BLD-MCNC: 211 MG/DL (ref 74–124)
HCT VFR BLD AUTO: 41 % (ref 34–46.6)
HGB BLD-MCNC: 13.2 G/DL (ref 12–15.9)
IMM GRANULOCYTES # BLD AUTO: 0.01 10*3/MM3 (ref 0–0.05)
IMM GRANULOCYTES NFR BLD AUTO: 0.2 % (ref 0–0.5)
LYMPHOCYTES # BLD AUTO: 1.05 10*3/MM3 (ref 0.7–3.1)
LYMPHOCYTES NFR BLD AUTO: 21.8 % (ref 19.6–45.3)
MCH RBC QN AUTO: 29.3 PG (ref 26.6–33)
MCHC RBC AUTO-ENTMCNC: 32.2 G/DL (ref 31.5–35.7)
MCV RBC AUTO: 90.9 FL (ref 79–97)
MONOCYTES # BLD AUTO: 0.78 10*3/MM3 (ref 0.1–0.9)
MONOCYTES NFR BLD AUTO: 16.2 % (ref 5–12)
NEUTROPHILS # BLD AUTO: 2.88 10*3/MM3 (ref 1.4–7)
NEUTROPHILS NFR BLD AUTO: 59.7 % (ref 42.7–76)
NRBC BLD AUTO-RTO: 0 /100 WBC (ref 0–0)
PLATELET # BLD AUTO: 264 10*3/MM3 (ref 140–450)
PMV BLD AUTO: 9.8 FL (ref 6–12)
POTASSIUM BLD-SCNC: 4.4 MMOL/L (ref 3.5–4.7)
PROT SERPL-MCNC: 6.9 G/DL (ref 6.3–8)
RBC # BLD AUTO: 4.51 10*6/MM3 (ref 3.77–5.28)
SODIUM BLD-SCNC: 140 MMOL/L (ref 134–145)
WBC NRBC COR # BLD: 4.82 10*3/MM3 (ref 3.4–10.8)

## 2019-02-22 PROCEDURE — 80053 COMPREHEN METABOLIC PANEL: CPT | Performed by: INTERNAL MEDICINE

## 2019-02-22 PROCEDURE — 85025 COMPLETE CBC W/AUTO DIFF WBC: CPT | Performed by: INTERNAL MEDICINE

## 2019-02-22 PROCEDURE — 36415 COLL VENOUS BLD VENIPUNCTURE: CPT | Performed by: INTERNAL MEDICINE

## 2019-02-22 PROCEDURE — 99214 OFFICE O/P EST MOD 30 MIN: CPT | Performed by: INTERNAL MEDICINE

## 2019-02-22 NOTE — PROGRESS NOTES
Subjective    REASONS FOR FOLLOWUP:   1. 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   2. 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   3. Primary radiation to left lung bronchoalveolar carcinoma given in March 2013.   4. EGFR mutation negative in the left upper lobe cancer.   5. Multinodular goiter.          History of Present Illness patient is an 86-year-old female with 2 separate lung cancers treated with surgery on 1 occasion and radiation on the other most recent one being in March 2013.  She comes in for follow-up today doing fairly well.      Weight and appetite are stable but her cough is getting a little more problematic especially at night and interfering with her sleep     She is no longer taking her cough medicine because it was too expensive and is actually coughing less thankfully      We have decided to move to annual CAT scans because of her age and stability of imaging-for the time being she still interested in treatment for new malignancy provided his noninvasive such as radiation  We did review her CAT scan which shows no change in a 5 mm nodule in her lung and no new problems      Active Ambulatory Problems     Diagnosis Date Noted   • Essential hypertension 03/29/2017   • Non-rheumatic mitral regurgitation 03/29/2017   • Malignant neoplasm of middle lobe of right lung (CMS/HCC) 08/25/2017   • Type 2 diabetes mellitus without complication (CMS/HCC) 07/18/2018   • Mixed hyperlipidemia 07/18/2018     Resolved Ambulatory Problems     Diagnosis Date Noted   • No Resolved Ambulatory Problems     Past Medical History:   Diagnosis Date   • Arthritis    • Diabetes mellitus (CMS/HCC)    • Disease of thyroid gland    • GERD (gastroesophageal reflux disease)    • Goiter    • Hypercholesterolemia    • Hypertension    • Lung cancer (CMS/HCC) 2002   • Lung cancer (CMS/HCC)    • Mitral valve regurgitation    • 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    • Vocal cord cyst 1972        Past Surgical History:   Procedure Laterality Date   • CATARACT EXTRACTION Bilateral    • HYSTERECTOMY  1970   • LUNG LOBECTOMY Right 2002   • VOCAL CORD BIOPSY      Benign cyst removed     PAST SURGICAL HISTORY: Hysterectomy in , benign cyst removed from the vocal cord in , cataract surgery and right upper lobectomy in 2002.       OB/GYN HISTORY:  1, para 1.     ONCOLOGIC HISTORY: History from previous dates can be found in the separate document.   ONCOLOGIC HISTORY: Well differentiated adenocarcinoma left upper lobe measuring 2 x 2 x 1 cm, negative lymph nodes, T1N0 post right upper lobectomy. No adjuvant treatment.   The patient is an 82-year-old Chinese lady with history of T1N0 well-differentiated adenocarcinoma of the right upper lobe, treated with lobectomy in 2002 who was followed perspectively with scans and in  was noted to have a small abnormality in t he left lung which was described as ground glass opacity. This remained unchanged through , but in 2012 CT of the chest showed the ground glass opacity in the perihilar region appeared slightly larger and more confluent than the previous scan i n  and the curvilinear area of patchy focal density in the left apex that was new, which was felt to be scarring. There was no adenopathy appreciated. This led to a bronchoscopy by Dr. Tha Brandt in 2012, which was nondiagnostic. She was also referred to a gastroenterologist because of minimal rectal bleeding and underwent a colonoscopy, which was unremarkable. Followup chest CT on 10/17/12 without contrast at [facility] showed further enlargement of the lingular infiltrate to 2.3 x 2.2 cm comp a red to 1.8 x 2.2 in May. Dr. Brandt discussed the situation with her and told her that he did not think she would tolerate surgery because of her age and the previous right upper lobectomy and felt that she may not do well with chemo and radiation either a nd  because she wanted another opinion, she visited her son in [place] and went to [facility] where she was seen at the Lung Clinic. She was presented to their Tumor Board and they recommended that she have a CT with contrast and CT guided biopsy of the are a before making any decisions and repeat PFTs to see if she would be a surgical candidate. The patient proceeded with a CT on 12/27/12 and PFTs and decided to see me for a third opinion regarding her cancer and treatment options.   The patient was present ed at thoracic clinic and CT guided biopsy was felt to be the best approach as they did not think EBUS or navigational bronchoscopy would yield a diagnosis. The radiologist felt this was feasible and was performed 1/7/13 with the findings of a well diffe r entiated adenocarcinoma. Pre-biopsy PET scan showed very low level uptake in the lesion and no adenopathy. The patient was not felt to be a surgical candidate because of her age and previous right upper lobectomy and focused radiation was felt to be her best option. The patient had been to [facility] for a second opinion and they also concurred with this approach. EGFR mutation status is not known but ordered because of her ethnic origin and the fact that if she has recurrence of disease after radiation, thi s could be a consideration of therapy.   The patient is EGFR mutation negative.   Patient treated with primary radiation to the left lung carcinoma in March. CAT scans dated 5/22/13 showed ground-glass opacity in the left hilar region shrunk from 3.1 x 3.1 to 2.2 x 2.   The patient was seen on 02/17/2014 with a repeat CT scan of the chest and abdomen, dated 02/10/2014 that shows a multinodular goiter, which is unchanged, and further resolution of the radiation changes with no definite mass, and the abdomen is benign.   CAT scans dated 8/2014 shows persistent finding of chronic infiltrates stable since February. No signs of recurrent cancer (no contrast given).   The  patient was seen 2015 with a CAT scan that shows stable radiation changes in the left upper lobe and stable small nodule in the right upper lobe. Continued followup planned.   The patient is seen on 2015 with a CT scan of chest and abdomen with contrast that showed no evidence of recurrent cancer. There are radiation changes in left l nora with fiducial markers in the region, and previous right upper lobectomy and a stable 6 mm nodule in the right lobe unchanged for over 3 years. Abdomen is benign.   Patient was seen on 2016 with a CT scan of the chest without contrast that showed a stable 6 mm nodule in the right middle lobe and no other lung nodules.    patient is an 86-year-old female with remote history of bronchioloalveolar carcinoma in  and a second malignancy in the left lung in  treated with focused radiation.  She comes in today with a 6 month follow-up and scans and overall she is doing well.  She has no weight loss or hemoptysis or pain in the chest.  She has a chronic cough but is not very significant and does not require much treatment.  Appetite and energy are fine.  She continues with mammography and follows with her family doctor for her medical issues routinely.  Thankfully the CAT scan shows stable 6mm  nodule in the right middle lobe since  with no new abnormalities.  Her thyroid goiter is also stable.  She's had a mammogram and this was normal       SOCIAL HISTORY: She is  for the second time. She smoked briefly for 18 years and stopped 50 years ago. She is not a drinker.     FAMILY HISTORY: Father  at age 65 of a blood clot. Mother  at age 86 of a fall. She has 2 brothers and 3 sisters. One sister had breast c ancer at a young age and  of kidney failure and one from complications of schizophrenia at 56. Another sister  of heart problems at age 65. No other cancer in the family. Her son is healthy.     Review of Systems   Constitutional:  Negative for chills, fatigue and fever.   Respiratory: Positive for cough (better 2/22/19). Negative for chest tightness and shortness of breath.    Cardiovascular: Negative for chest pain.   Gastrointestinal: Negative for constipation, diarrhea, nausea and vomiting.   Genitourinary: Negative for difficulty urinating.   Musculoskeletal: Positive for arthralgias (fingers 2/22/19) and myalgias (legs and feet cramps at night 2/22/19).   Neurological: Negative for dizziness, numbness and headaches.   Psychiatric/Behavioral: The patient is not nervous/anxious.       A comprehensive 14 point review of systems was performed and was negative except as mentioned.      Current Outpatient Medications:   •  ACETAMINOPHEN PO, Take  by mouth., Disp: , Rfl:   •  amLODIPine (NORVASC) 10 MG tablet, Take 1 tablet by mouth Daily., Disp: , Rfl:   •  Aspirin (ASPIR-81 PO), Take 81 mg by mouth Daily., Disp: , Rfl:   •  atenolol (TENORMIN) 25 MG tablet, Take 1.5 tablets by mouth 2 (Two) Times a Day. (Patient taking differently: Take 25 mg by mouth 3 (Three) Times a Day.), Disp: 270 tablet, Rfl: 1  •  Cholecalciferol (VITAMIN D) 1000 UNITS tablet, Take 2 tablets by mouth., Disp: , Rfl:   •  Docusate Sodium (STOOL SOFTENER) 100 MG capsule, Take  by mouth., Disp: , Rfl:   •  fenofibrate (TRICOR) 145 MG tablet, Take  by mouth daily., Disp: , Rfl:   •  glimepiride (AMARYL) 1 MG tablet, Take  by mouth 3 (Three) Times a Day., Disp: , Rfl:   •  HYDROcodone-homatropine (HYCODAN) 5-1.5 MG/5ML syrup, Take 5 mL by mouth Every 6 (Six) Hours As Needed for Cough., Disp: 240 mL, Rfl: 0  •  losartan (COZAAR) 100 MG tablet, Take 1 tablet by mouth Daily., Disp: , Rfl:   •  Magnesium 250 MG tablet, Take  by mouth Daily., Disp: , Rfl:   •  rosuvastatin (CRESTOR) 10 MG tablet, Take 10 mg by mouth., Disp: , Rfl:     ALLERGIES:    Allergies   Allergen Reactions   • Contrast Dye    • Iodinated Diagnostic Agents Rash       Objective      There were no vitals filed  for this visit.  Current Status 8/9/2018   ECOG score 0       Physical Exam    GENERAL:  Well-developed, well-nourished in no acute distress.   SKIN:  Warm, dry without rashes, purpura or petechiae.  HEAD:  Normocephalic.  EYES:  Pupils equal, round and reactive to light.  EOMs intact.  Conjunctivae normal.  EARS:  Hearing intact.  NOSE:  Septum midline.  No excoriations or nasal discharge.  MOUTH:  Tongue is well-papillated; no stomatitis or ulcers.  Lips normal.  THROAT:  Oropharynx without lesions or exudates.  Moderate thyromegaly is stable  NECK:  Supple with good range of motion; thyromegaly noted no  masses, no JVD.  LYMPHATICS:  No cervical, supraclavicular, axillary or inguinal adenopathy.  CHEST:  Lungs clear to percussion and auscultation. Good airflow.  CARDIAC:  Regular rate and rhythm without murmurs, rubs or gallops. Normal S1,S2.  ABDOMEN:  Soft, nontender with no organomegaly or masses.  EXTREMITIES:  No clubbing, cyanosis or edema.  NEUROLOGICAL:  Cranial Nerves II-XII grossly intact.  No focal neurological deficits.  PSYCHIATRIC:  Normal affect and mood.        RECENT LABS:  Hematology WBC   Date Value Ref Range Status   08/09/2018 5.74 4.00 - 10.00 10*3/mm3 Final     RBC   Date Value Ref Range Status   08/09/2018 4.37 3.90 - 5.00 10*6/mm3 Final     Hemoglobin   Date Value Ref Range Status   08/09/2018 12.5 11.5 - 14.9 g/dL Final     Hematocrit   Date Value Ref Range Status   08/09/2018 38.5 34.0 - 45.0 % Final     Platelets   Date Value Ref Range Status   08/09/2018 279 150 - 375 10*3/mm3 Final                Chest x-ray pending      CT CHEST WO CONTRAST  CONCLUSION: Stable CT scan of the chest similar to 02/05/2018. 6 mm  right middle lobe pulmonary nodule remains stable. Postradiation  treatment change involving the left lung remains stable as well. No  acute intrathoracic abnormality.     This report was finalized on 2/7/2019         Assessment/Plan   1.T1N0 well differentiated bronchoalveolar  carcinoma left lung. 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   3.primary radiation to left lung bronchoalveolar carcinoma given in March 2013.   4.EGFR mutation negative in the left upper lobe cancer.   5.Multinodular goiter.     Plan  Return in 12 months with  CAT of the chest without contrast      CC:

## 2019-04-01 RX ORDER — ATENOLOL 25 MG/1
TABLET ORAL
Qty: 270 TABLET | Refills: 1 | Status: SHIPPED | OUTPATIENT
Start: 2019-04-01 | End: 2021-05-12

## 2019-05-09 NOTE — TELEPHONE ENCOUNTER
----- Message from Alfredo Stroud sent at 5/9/2019  2:31 PM EDT -----  Contact: [phone]  Wanting cough meds like she had when she first had lung cancer. She now uses [facility].    Pt called stating she wanted a refill on a hycodan that Dr. Caraballo prescribed last year. No new symptoms, just same cough from radiation. Medication routed to Dr. Caraballo to approve if she feels necessary

## 2019-11-09 ENCOUNTER — HOSPITAL ENCOUNTER (EMERGENCY)
Facility: HOSPITAL | Age: 84
Discharge: HOME | End: 2019-11-09
Attending: EMERGENCY MEDICINE | Admitting: EMERGENCY MEDICINE

## 2019-11-09 ENCOUNTER — APPOINTMENT (OUTPATIENT)
Dept: GENERAL RADIOLOGY | Facility: HOSPITAL | Age: 84
End: 2019-11-09

## 2019-11-09 VITALS
HEART RATE: 56 BPM | OXYGEN SATURATION: 99 % | RESPIRATION RATE: 18 BRPM | BODY MASS INDEX: 19.44 KG/M2 | DIASTOLIC BLOOD PRESSURE: 70 MMHG | WEIGHT: 99 LBS | SYSTOLIC BLOOD PRESSURE: 154 MMHG | HEIGHT: 60 IN | TEMPERATURE: 97.9 F

## 2019-11-09 DIAGNOSIS — T50.905A MEDICATION REACTION, INITIAL ENCOUNTER: Primary | ICD-10-CM

## 2019-11-09 LAB
ALBUMIN SERPL-MCNC: 4.2 G/DL (ref 3.5–5.2)
ALBUMIN/GLOB SERPL: 1.3 G/DL
ALP SERPL-CCNC: 43 U/L (ref 39–117)
ALT SERPL W P-5'-P-CCNC: 15 U/L (ref 1–33)
ANION GAP SERPL CALCULATED.3IONS-SCNC: 11.4 MMOL/L (ref 5–15)
AST SERPL-CCNC: 18 U/L (ref 1–32)
BASOPHILS # BLD AUTO: 0.05 10*3/MM3 (ref 0–0.2)
BASOPHILS NFR BLD AUTO: 0.4 % (ref 0–1.5)
BILIRUB SERPL-MCNC: 0.4 MG/DL (ref 0.2–1.2)
BUN BLD-MCNC: 26 MG/DL (ref 8–23)
BUN/CREAT SERPL: 26.3 (ref 7–25)
CALCIUM SPEC-SCNC: 9.4 MG/DL (ref 8.6–10.5)
CHLORIDE SERPL-SCNC: 103 MMOL/L (ref 98–107)
CO2 SERPL-SCNC: 26.6 MMOL/L (ref 22–29)
CREAT BLD-MCNC: 0.99 MG/DL (ref 0.57–1)
DEPRECATED RDW RBC AUTO: 40.7 FL (ref 37–54)
EOSINOPHIL # BLD AUTO: 0.04 10*3/MM3 (ref 0–0.4)
EOSINOPHIL NFR BLD AUTO: 0.3 % (ref 0.3–6.2)
ERYTHROCYTE [DISTWIDTH] IN BLOOD BY AUTOMATED COUNT: 12.5 % (ref 12.3–15.4)
GFR SERPL CREATININE-BSD FRML MDRD: 53 ML/MIN/1.73
GFR SERPL CREATININE-BSD FRML MDRD: 64 ML/MIN/1.73
GLOBULIN UR ELPH-MCNC: 3.3 GM/DL
GLUCOSE BLD-MCNC: 59 MG/DL (ref 65–99)
GLUCOSE BLDC GLUCOMTR-MCNC: 133 MG/DL (ref 70–130)
HCT VFR BLD AUTO: 37.9 % (ref 34–46.6)
HGB BLD-MCNC: 12.3 G/DL (ref 12–15.9)
IMM GRANULOCYTES # BLD AUTO: 0.05 10*3/MM3 (ref 0–0.05)
IMM GRANULOCYTES NFR BLD AUTO: 0.4 % (ref 0–0.5)
LYMPHOCYTES # BLD AUTO: 2.71 10*3/MM3 (ref 0.7–3.1)
LYMPHOCYTES NFR BLD AUTO: 23.3 % (ref 19.6–45.3)
MAGNESIUM SERPL-MCNC: 2.2 MG/DL (ref 1.6–2.4)
MCH RBC QN AUTO: 29.3 PG (ref 26.6–33)
MCHC RBC AUTO-ENTMCNC: 32.5 G/DL (ref 31.5–35.7)
MCV RBC AUTO: 90.2 FL (ref 79–97)
MONOCYTES # BLD AUTO: 0.95 10*3/MM3 (ref 0.1–0.9)
MONOCYTES NFR BLD AUTO: 8.2 % (ref 5–12)
NEUTROPHILS # BLD AUTO: 7.82 10*3/MM3 (ref 1.7–7)
NEUTROPHILS NFR BLD AUTO: 67.4 % (ref 42.7–76)
NRBC BLD AUTO-RTO: 0 /100 WBC (ref 0–0.2)
PLATELET # BLD AUTO: 313 10*3/MM3 (ref 140–450)
PMV BLD AUTO: 10 FL (ref 6–12)
POTASSIUM BLD-SCNC: 3.8 MMOL/L (ref 3.5–5.2)
PROT SERPL-MCNC: 7.5 G/DL (ref 6–8.5)
RBC # BLD AUTO: 4.2 10*6/MM3 (ref 3.77–5.28)
SODIUM BLD-SCNC: 141 MMOL/L (ref 136–145)
T4 FREE SERPL-MCNC: 1.58 NG/DL (ref 0.93–1.7)
TROPONIN T SERPL-MCNC: <0.01 NG/ML (ref 0–0.03)
TSH SERPL DL<=0.05 MIU/L-ACNC: 0.62 UIU/ML (ref 0.27–4.2)
WBC NRBC COR # BLD: 11.62 10*3/MM3 (ref 3.4–10.8)

## 2019-11-09 PROCEDURE — 83735 ASSAY OF MAGNESIUM: CPT | Performed by: NURSE PRACTITIONER

## 2019-11-09 PROCEDURE — 84439 ASSAY OF FREE THYROXINE: CPT | Performed by: NURSE PRACTITIONER

## 2019-11-09 PROCEDURE — 84484 ASSAY OF TROPONIN QUANT: CPT | Performed by: NURSE PRACTITIONER

## 2019-11-09 PROCEDURE — 99283 EMERGENCY DEPT VISIT LOW MDM: CPT

## 2019-11-09 PROCEDURE — 84443 ASSAY THYROID STIM HORMONE: CPT | Performed by: NURSE PRACTITIONER

## 2019-11-09 PROCEDURE — 93005 ELECTROCARDIOGRAM TRACING: CPT

## 2019-11-09 PROCEDURE — 80053 COMPREHEN METABOLIC PANEL: CPT | Performed by: NURSE PRACTITIONER

## 2019-11-09 PROCEDURE — 96360 HYDRATION IV INFUSION INIT: CPT

## 2019-11-09 PROCEDURE — 71046 X-RAY EXAM CHEST 2 VIEWS: CPT

## 2019-11-09 PROCEDURE — 82962 GLUCOSE BLOOD TEST: CPT

## 2019-11-09 PROCEDURE — 93010 ELECTROCARDIOGRAM REPORT: CPT | Performed by: INTERNAL MEDICINE

## 2019-11-09 PROCEDURE — 85025 COMPLETE CBC W/AUTO DIFF WBC: CPT | Performed by: NURSE PRACTITIONER

## 2019-11-09 PROCEDURE — 93005 ELECTROCARDIOGRAM TRACING: CPT | Performed by: EMERGENCY MEDICINE

## 2019-11-09 RX ADMIN — SODIUM CHLORIDE 500 ML: 9 INJECTION, SOLUTION INTRAVENOUS at 08:27

## 2019-11-09 NOTE — ED PROVIDER NOTES
"The KAREN and I have discussed this patient's history, physical exam, and treatment plan. I have reviewed the documentation and personally had a face to face interaction with the patient  I affirm the documentation and agree with the treatment and plan.  The following describes my personal findings.    The patient presents complaining of \"loud beating\" from her heart beginning last night. Pt denies CP, fevers, and N/V. Pt has been dealing with URI symptoms for the last three days with minor SOA, unchanged today and was started on levaquin and steroids two days ago by a physician at Urgent Care. Pt with hx of DM. Pt denies hx of smoking. Pt denies any blood thinner use.     Limited physical exam:  Patient is nontoxic appearing, awake alert in NAD  Lungs/cardiovascular: CTA bilaterally, no rales, regular rhythm, non-tachycardic rate, 2/6 systolic murmur  Abdomen: soft, NT  Back/extremities: unremarkable, PT pulses palp with no edema BLE    EKG          EKG time: 0722  Rhythm/Rate: nsr, 50s  P waves and HI: nl  QRS, axis: left bundle branch pattern   ST and T waves: nonspecific ST and T wave findings      Interpreted Contemporaneously by me, independently viewed.   Minimally changed compared to prior 2/14/13.     Anticipate outpatient follow with aggressive hydration, discuss d/c of antibiotics if not indicated based on evaluation today.      Documentation assistance provided by lori Gray.  Information recorded by the scribe was done at my direction and has been verified and validated by me.           Mu Gray  11/09/19 0905       Aida Florence MD  11/09/19 1139    "

## 2019-11-09 NOTE — DISCHARGE INSTRUCTIONS
-Home to rest. Drink plenty of fluids. Take OTC medications like mucinex, saline spray, coricidin HBP for symptoms. Stop the prednisone and the levaquin.    -Follow up with your primary care provider, call to make an appointment for ED follow up and further evaluation.    -Return to the ER for increased shortness of breath, fever that does not respond to Tylenol or Motrin, chest pain, vomiting, any new or other concerns.    We encourage all patients to follow up with your primary care provider for blood pressure recheck.  If you are a smoker, work on decreasing and stopping tobacco use. Follow up with your PCP to discuss medications that may assist in tobacco cessation if interested.

## 2019-11-09 NOTE — ED PROVIDER NOTES
"EMERGENCY DEPARTMENT ENCOUNTER    Room Number:  21/21  Date seen:  11/9/2019  Time seen: 7:38 AM  PCP: Maurice Castro MD    HPI:  Chief complaint:Palpitations  Context:Gael Tarango is a 89 y.o. female who presents to the ED with c/o palpitations that began around 0100 this morning. She notes that she woke up this morning and \"could hear my heart beat very loudly\".  Patient was seen at the urgent care 2 days ago and placed on prednisone and Levaquin for symptoms of a URI.  She reports that after taking the prednisone and antibiotic, she began to feel very jittery, had trouble sleeping, and felt all \"revved up\" along with feeling her heartbeat.  She believes it could be due to medication side effect.  She has never had prednisone before.  Pt admits to cough, sinus drainage, and nasal congestion. She denies fever, chills, CP, SOA, ear pain, headache, wheezing, and abd pain.  She is feeling well now.    Onset: gradual  Location:n/a  Radiation: n/a  Duration: 7 hours  Timing: constant  Character:palpitations  Aggravating Factors: none  Alleviating Factors: none  Severity: mild    MEDICAL RECORD REVIEW  Pt is seen by Dr. Caraballo (oncology) with her last visit being in February 2019. Pt has hx of lung CA and is being f/u with annual lung scans. Pt was seen at the  11/7/19 and was dx with a URI. Pt was prescribed Levaquin and Prednisone at that time.    ALLERGIES  Contrast dye and Iodinated diagnostic agents    PAST MEDICAL HISTORY  Active Ambulatory Problems     Diagnosis Date Noted   • Essential hypertension 03/29/2017   • Non-rheumatic mitral regurgitation 03/29/2017   • Malignant neoplasm of middle lobe of right lung (CMS/HCC) 08/25/2017   • Type 2 diabetes mellitus without complication (CMS/HCC) 07/18/2018   • Mixed hyperlipidemia 07/18/2018     Resolved Ambulatory Problems     Diagnosis Date Noted   • No Resolved Ambulatory Problems     Past Medical History:   Diagnosis Date   • Arthritis    • Diabetes " mellitus (CMS/HCC)    • Disease of thyroid gland    • GERD (gastroesophageal reflux disease)    • Goiter    • Hypercholesterolemia    • Hypertension    • Lung cancer (CMS/HCC) 2002   • Lung cancer (CMS/HCC)    • Mitral valve regurgitation    • 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    • Vocal cord cyst 1972       PAST SURGICAL HISTORY  Past Surgical History:   Procedure Laterality Date   • CATARACT EXTRACTION Bilateral 2000   • HYSTERECTOMY  1970   • LUNG LOBECTOMY Right 06/2002   • VOCAL CORD BIOPSY  1972    Benign cyst removed       FAMILY HISTORY  Family History   Problem Relation Age of Onset   • Tuberculosis Mother    • Clotting disorder Father    • Hypertension Father    • Heart disease Sister    • Mental illness Sister         Schizophrenia   • Diabetes Brother    • Heart disease Brother    • Hypertension Brother    • Cancer Sister         Breast   • Kidney disease Sister    • Thyroid disease Sister        SOCIAL HISTORY  Social History     Socioeconomic History   • Marital status:      Spouse name: Not on file   • Number of children: Not on file   • Years of education: College   • Highest education level: Not on file   Occupational History   • Occupation: Teacher     Employer: RETIRED     Comment: [employer]   Tobacco Use   • Smoking status: Former Smoker     Packs/day: 1.00     Years: 18.00     Pack years: 18.00     Types: Cigarettes   • Smokeless tobacco: Never Used   • Tobacco comment: caffeine use   Substance and Sexual Activity   • Alcohol use: No   • Drug use: No   • Sexual activity: Defer       REVIEW OF SYSTEMS  Review of Systems   Constitutional: Negative.  Negative for chills and fever.   HENT: Negative.    Eyes: Negative.    Respiratory: Positive for cough. Negative for shortness of breath and wheezing.    Cardiovascular: Positive for palpitations. Negative for chest pain.   Gastrointestinal: Negative for abdominal pain, nausea and vomiting.   Genitourinary: Negative.  Negative for dysuria and hematuria.    Musculoskeletal: Negative.    Skin: Negative.  Negative for rash.   Neurological: Negative.  Negative for syncope and headaches.   Psychiatric/Behavioral: Negative.  Negative for confusion.       PHYSICAL EXAM  ED Triage Vitals   Temp Heart Rate Resp BP SpO2   11/09/19 0717 11/09/19 0716 11/09/19 0716 -- 11/09/19 0716   97.9 °F (36.6 °C) 64 18  97 %      Temp src Heart Rate Source Patient Position BP Location FiO2 (%)   11/09/19 0717 11/09/19 0716 -- -- --   Tympanic Monitor        Physical Exam   Constitutional: She is oriented to person, place, and time and well-developed, well-nourished, and in no distress. No distress.   HENT:   Head: Normocephalic and atraumatic.   Mouth/Throat: Mucous membranes are normal.   Eyes: Pupils are equal, round, and reactive to light.   Neck: Normal range of motion. Neck supple.   Cardiovascular: Normal rate, regular rhythm and normal heart sounds.   Pulmonary/Chest: Effort normal and breath sounds normal. No respiratory distress.   Abdominal: Soft. There is no tenderness. There is no rebound and no guarding.   Neurological: She is alert and oriented to person, place, and time. She has normal strength.   Skin: Skin is warm, dry and intact.   Psychiatric: Mood, affect and judgment normal.   Nursing note and vitals reviewed.      LAB RESULTS  Recent Results (from the past 24 hour(s))   Comprehensive Metabolic Panel    Collection Time: 11/09/19  8:03 AM   Result Value Ref Range    Glucose 59 (L) 65 - 99 mg/dL    BUN 26 (H) 8 - 23 mg/dL    Creatinine 0.99 0.57 - 1.00 mg/dL    Sodium 141 136 - 145 mmol/L    Potassium 3.8 3.5 - 5.2 mmol/L    Chloride 103 98 - 107 mmol/L    CO2 26.6 22.0 - 29.0 mmol/L    Calcium 9.4 8.6 - 10.5 mg/dL    Total Protein 7.5 6.0 - 8.5 g/dL    Albumin 4.20 3.50 - 5.20 g/dL    ALT (SGPT) 15 1 - 33 U/L    AST (SGOT) 18 1 - 32 U/L    Alkaline Phosphatase 43 39 - 117 U/L    Total Bilirubin 0.4 0.2 - 1.2 mg/dL    eGFR Non African Amer 53 (L) >60 mL/min/1.73    eGFR    Amer 64 >60 mL/min/1.73    Globulin 3.3 gm/dL    A/G Ratio 1.3 g/dL    BUN/Creatinine Ratio 26.3 (H) 7.0 - 25.0    Anion Gap 11.4 5.0 - 15.0 mmol/L   TSH    Collection Time: 11/09/19  8:03 AM   Result Value Ref Range    TSH 0.623 0.270 - 4.200 uIU/mL   T4, Free    Collection Time: 11/09/19  8:03 AM   Result Value Ref Range    Free T4 1.58 0.93 - 1.70 ng/dL   Magnesium    Collection Time: 11/09/19  8:03 AM   Result Value Ref Range    Magnesium 2.2 1.6 - 2.4 mg/dL   Troponin    Collection Time: 11/09/19  8:03 AM   Result Value Ref Range    Troponin T <0.010 0.000 - 0.030 ng/mL   CBC Auto Differential    Collection Time: 11/09/19  8:03 AM   Result Value Ref Range    WBC 11.62 (H) 3.40 - 10.80 10*3/mm3    RBC 4.20 3.77 - 5.28 10*6/mm3    Hemoglobin 12.3 12.0 - 15.9 g/dL    Hematocrit 37.9 34.0 - 46.6 %    MCV 90.2 79.0 - 97.0 fL    MCH 29.3 26.6 - 33.0 pg    MCHC 32.5 31.5 - 35.7 g/dL    RDW 12.5 12.3 - 15.4 %    RDW-SD 40.7 37.0 - 54.0 fl    MPV 10.0 6.0 - 12.0 fL    Platelets 313 140 - 450 10*3/mm3    Neutrophil % 67.4 42.7 - 76.0 %    Lymphocyte % 23.3 19.6 - 45.3 %    Monocyte % 8.2 5.0 - 12.0 %    Eosinophil % 0.3 0.3 - 6.2 %    Basophil % 0.4 0.0 - 1.5 %    Immature Grans % 0.4 0.0 - 0.5 %    Neutrophils, Absolute 7.82 (H) 1.70 - 7.00 10*3/mm3    Lymphocytes, Absolute 2.71 0.70 - 3.10 10*3/mm3    Monocytes, Absolute 0.95 (H) 0.10 - 0.90 10*3/mm3    Eosinophils, Absolute 0.04 0.00 - 0.40 10*3/mm3    Basophils, Absolute 0.05 0.00 - 0.20 10*3/mm3    Immature Grans, Absolute 0.05 0.00 - 0.05 10*3/mm3    nRBC 0.0 0.0 - 0.2 /100 WBC   POC Glucose Once    Collection Time: 11/09/19  9:44 AM   Result Value Ref Range    Glucose 133 (H) 70 - 130 mg/dL       I ordered the above labs and reviewed the results    RADIOLOGY  XR Chest 2 View   Final Result   No focal pulmonary consolidation. Chronic changes of the   left lung. Borderline heart size. Follow-up as clinical indications   persist.       This report was  finalized on 11/9/2019 8:39 AM by Dr. Tha Malagon M.D.              I ordered the above noted radiological studies and reviewed the images on the PACS system.     MEDICATIONS GIVEN IN ER  Medications   sodium chloride 0.9 % bolus 500 mL (0 mL Intravenous Stopped 11/9/19 1006)       EKG  Interpreted by ED Physician Dr. Florence     PROCEDURES  Procedures      PROGRESS       0819- Discussed the patient and plan of care with Dr. Florence. After a bedside evaluation; they agree with the plan of care.    0919- Patient rechecked. Pt states that they feel improvement after fluids and eating.  She denies any symptoms now and states that her URI symptoms have actually improved.  She has no wheezing and no dyspnea.  We have recommended that she discontinue her prednisone and Levaquin as no sign of acute bacterial infection today and difficulty tolerating medications.  Gave strict return to ER instructions and patient is to follow-up with primary care as well, especially if any signs of worsening in condition or infection. Pt denies any symptoms or complaints at this time. I think likely her symptoms were due to the prednisone.  I discussed today's findings with the patient, explaining any pertinent positives and negatives from today's visit, and the plan of care.  I answered any of the patient's questions.  They were given appropriate follow-up with family physician and/or specialist. Patient is aware that discharge does not mean there is nothing wrong, it indicates no emergency is present and they must continue their care with a primary care provider and/or specialist. Given instructions for BP recheck with PCP. I advised them on when to return to the ED for further evaluation. The patient verbalized understanding. The patient's history, physical exam, and lab findings were discussed with the physician, who also performed a face to face history and physical exam. The patient was discharged in stable condition.     1010-  "Pt's glucose was 130 at DC per the nurse.        Disposition vitals:  /70   Pulse 56   Temp 97.9 °F (36.6 °C) (Tympanic)   Resp 18   Ht 152.4 cm (60\")   Wt 44.9 kg (99 lb)   SpO2 99%   BMI 19.33 kg/m²       DIAGNOSIS  Final diagnoses:   Medication reaction, initial encounter       FOLLOW UP   Maurice Castro MD  [facility] [address] [place] [address]  [phone]    Schedule an appointment as soon as possible for a visit         RX     Medication List      Stop    levoFLOXacin 500 MG tablet  Commonly known as:  LEVAQUIN     predniSONE 20 MG tablet  Commonly known as:  DELTASONE            Documentation assistance provided by lori Dangelo for Aida MORGAN.  Information recorded by the scribe was done at my direction and has been verified and validated by me.         Niharika Dangelo  11/09/19 1011       Aida Cardona APRN  11/09/19 1425    "

## 2021-05-11 ENCOUNTER — TELEPHONE (OUTPATIENT)
Dept: ONCOLOGY | Facility: CLINIC | Age: 86
End: 2021-05-11

## 2021-05-11 NOTE — TELEPHONE ENCOUNTER
----- Message from Danielito Caraballo MD sent at 5/11/2021 12:33 PM EDT -----  Regarding: FW: wanting an appt before the 24th  Put on  a wed at noon  ----- Message -----  From: Demi Booth RegSched Rep  Sent: 5/11/2021  12:22 PM EDT  To: Danielito Caraballo MD, Majouzma Murray, #  Subject: wanting an appt before the 24th                  Calling from primary care office, dr wilson. Needs to be seen.    Son is ricky fitzpatrick [phone] he is wanting her to be seen before he returns home which is 5/24. First available is 6/11.     Can you please advise what I should do?

## 2021-05-12 ENCOUNTER — OFFICE VISIT (OUTPATIENT)
Dept: CARDIOLOGY | Facility: CLINIC | Age: 86
End: 2021-05-12

## 2021-05-12 ENCOUNTER — TELEPHONE (OUTPATIENT)
Dept: ONCOLOGY | Facility: CLINIC | Age: 86
End: 2021-05-12

## 2021-05-12 VITALS
HEART RATE: 70 BPM | DIASTOLIC BLOOD PRESSURE: 60 MMHG | BODY MASS INDEX: 18.14 KG/M2 | HEIGHT: 59 IN | WEIGHT: 90 LBS | SYSTOLIC BLOOD PRESSURE: 110 MMHG | OXYGEN SATURATION: 99 %

## 2021-05-12 DIAGNOSIS — I34.0 NON-RHEUMATIC MITRAL REGURGITATION: ICD-10-CM

## 2021-05-12 DIAGNOSIS — I10 ESSENTIAL HYPERTENSION: Primary | ICD-10-CM

## 2021-05-12 PROCEDURE — 99213 OFFICE O/P EST LOW 20 MIN: CPT | Performed by: INTERNAL MEDICINE

## 2021-05-12 PROCEDURE — 93000 ELECTROCARDIOGRAM COMPLETE: CPT | Performed by: INTERNAL MEDICINE

## 2021-05-12 RX ORDER — HYDRALAZINE HYDROCHLORIDE 50 MG/1
50 TABLET, FILM COATED ORAL 2 TIMES DAILY
COMMUNITY
Start: 2021-04-09

## 2021-05-12 RX ORDER — ATENOLOL 25 MG/1
25 TABLET ORAL 2 TIMES DAILY
Qty: 270 TABLET | Refills: 1
Start: 2021-05-12

## 2021-05-12 NOTE — TELEPHONE ENCOUNTER
Pt called to see if CT ordered prior to her visit on 5/14/21. No orders or mention in last note. Pt v/u.

## 2021-05-12 NOTE — PROGRESS NOTES
Date of Office Visit: 2021    Patient Name: Gael Tarango  : 1930    Encounter Provider: Moe Heredia MD  Referring Provider: No ref. provider found  Place of Service: [facility] CARDIOLOGY  Patient Care Team:  Maurice Castro MD as PCP - Danielito Jackman MD as Consulting Physician (Hematology and Oncology)  Maurice Castro MD as Referring Physician (Internal Medicine)      Chief Complaint   Patient presents with   • Hypertension   • Cardiac Valve Problem     History of Present Illness    She is a 89-year-old female with a history of hypertension and mitral regurgitation who returns to the office today for follow-up.  She was last seen here in 2019.  She did not come last year because of the Covid restrictions.  She has been vaccinated.    The patient is actually done very well since her last visit.  She has not had any major health issues.  She denies any chest pain or shortness of breath.  She does have some fatigue issues and is starting to slow down a little bit but overall doing okay.    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   • Vocal cord cyst     Removed         Past Surgical History:   Procedure Laterality Date   • CATARACT EXTRACTION Bilateral    • HYSTERECTOMY  1970   • LUNG LOBECTOMY Right 2002   • VOCAL CORD BIOPSY  1972    Benign cyst removed           Current Outpatient Medications:   •  ACETAMINOPHEN PO, Take  by mouth As Needed., Disp: , Rfl:   •   amLODIPine (NORVASC) 10 MG tablet, Take 1 tablet by mouth Daily., Disp: , Rfl:   •  Aspirin (ASPIR-81 PO), Take 81 mg by mouth Daily., Disp: , Rfl:   •  atenolol (TENORMIN) 25 MG tablet, Take 1 tablet by mouth 2 (Two) Times a Day., Disp: 270 tablet, Rfl: 1  •  Cholecalciferol (VITAMIN D) 1000 UNITS tablet, Take 2 tablets by mouth., Disp: , Rfl:   •  Docusate Sodium (STOOL SOFTENER) 100 MG capsule, Take  by mouth., Disp: , Rfl:   •  fenofibrate (TRICOR) 145 MG tablet, Take  by mouth daily., Disp: , Rfl:   •  glimepiride (AMARYL) 1 MG tablet, Take 1 mg by mouth 2 (Two) Times a Day., Disp: , Rfl:   •  Magnesium 250 MG tablet, Take  by mouth Daily., Disp: , Rfl:   •  rosuvastatin (CRESTOR) 10 MG tablet, Take 10 mg by mouth., Disp: , Rfl:   •  hydrALAZINE (APRESOLINE) 50 MG tablet, , Disp: , Rfl:   •  HYDROcodone-homatropine (HYCODAN) 5-1.5 MG/5ML syrup, Take 5 mL by mouth Every 6 (Six) Hours As Needed for Cough., Disp: 240 mL, Rfl: 0  •  losartan (COZAAR) 100 MG tablet, Take 1 tablet by mouth Daily., Disp: , Rfl:       Social History     Socioeconomic History   • Marital status:      Spouse name: Not on file   • Number of children: Not on file   • Years of education: College   • Highest education level: Not on file   Tobacco Use   • Smoking status: Former Smoker     Packs/day: 1.00     Years: 18.00     Pack years: 18.00     Types: Cigarettes   • Smokeless tobacco: Never Used   • Tobacco comment: caffeine use   Substance and Sexual Activity   • Alcohol use: No   • Drug use: No   • Sexual activity: Defer         Review of Systems   Constitutional: Positive for malaise/fatigue.   HENT: Negative.    Eyes: Negative.    Cardiovascular: Negative.    Respiratory: Negative.    Endocrine: Negative.    Skin: Negative.    Musculoskeletal: Negative.    Gastrointestinal: Negative.    Neurological: Negative.    Psychiatric/Behavioral: Negative.        Procedures      ECG 12 Lead    Date/Time: 5/12/2021 11:57 AM  Performed by:  "Moe Heredia MD  Authorized by: Moe Heredia MD   Comparison: compared with previous ECG from 11/9/2019  Rhythm: sinus rhythm  Rate: normal  Conduction: left bundle branch block  QRS axis: normal                  Objective:    /60   Pulse 70   Ht 149.9 cm (59\")   Wt 40.8 kg (90 lb)   SpO2 99%   BMI 18.18 kg/m²         Constitutional:       Appearance: Healthy appearance.   Neck:      Vascular: No JVR.   Pulmonary:      Effort: Pulmonary effort is normal.      Breath sounds: Normal breath sounds.   Cardiovascular:      Normal rate. Regular rhythm. Normal S1. Normal S2.      Murmurs: There is a high frequency blowing holosystolic murmur at the apex.      No click. No rub.   Pulses:     Intact distal pulses.   Edema:     Peripheral edema absent.             Assessment:       Diagnosis Plan   1. Essential hypertension     2. Non-rheumatic mitral regurgitation       1.  Hypertension: Controlled.  Patient now more bradycardic.  I am going to cut her atenolol down to 25 mg twice daily.  2.  Mitral regurgitation: Mild.  Asymptomatic       Plan:       We will continue to monitor her heart rate.  I do not think it will be an issue with her blood pressure to cut her medication back.  We will see her back next year  "

## 2021-05-12 NOTE — TELEPHONE ENCOUNTER
Caller: HAM    Relationship: PATIENT    Best call back number: [phone]     What is the best time to reach you: AFTER 2:30PM. PLEASE CALL AGAIN IF NO ANSWER THE FIRST TIME    What was the call regarding: PATIENT IS WANTING TO KNOW IF SHE NEEDS A CAT SCAN PRIOR TO 05/14. SHE SAYS SHE MUST TAKE PREDNISONE AND CIMETIDINE 13 HOURS PRIOR TO THE CT IF SHE MUST TAKE CONTRAST      Do you require a callback: YES

## 2021-05-14 ENCOUNTER — OFFICE VISIT (OUTPATIENT)
Dept: ONCOLOGY | Facility: CLINIC | Age: 86
End: 2021-05-14

## 2021-05-14 ENCOUNTER — LAB (OUTPATIENT)
Dept: LAB | Facility: HOSPITAL | Age: 86
End: 2021-05-14

## 2021-05-14 VITALS
OXYGEN SATURATION: 97 % | HEART RATE: 59 BPM | HEIGHT: 59 IN | DIASTOLIC BLOOD PRESSURE: 73 MMHG | SYSTOLIC BLOOD PRESSURE: 126 MMHG | BODY MASS INDEX: 19.61 KG/M2 | RESPIRATION RATE: 16 BRPM | TEMPERATURE: 97.1 F | WEIGHT: 97.3 LBS

## 2021-05-14 DIAGNOSIS — C34.2 MALIGNANT NEOPLASM OF MIDDLE LOBE OF RIGHT LUNG (HCC): Primary | ICD-10-CM

## 2021-05-14 LAB
ALBUMIN SERPL-MCNC: 4.3 G/DL (ref 3.5–5.2)
ALBUMIN/GLOB SERPL: 1.4 G/DL (ref 1.1–2.4)
ALP SERPL-CCNC: 53 U/L (ref 38–116)
ALT SERPL W P-5'-P-CCNC: 20 U/L (ref 0–33)
ANION GAP SERPL CALCULATED.3IONS-SCNC: 11.1 MMOL/L (ref 5–15)
AST SERPL-CCNC: 25 U/L (ref 0–32)
BASOPHILS # BLD AUTO: 0.06 10*3/MM3 (ref 0–0.2)
BASOPHILS NFR BLD AUTO: 0.9 % (ref 0–1.5)
BILIRUB SERPL-MCNC: 0.3 MG/DL (ref 0.2–1.2)
BUN SERPL-MCNC: 20 MG/DL (ref 6–20)
BUN/CREAT SERPL: 22.2 (ref 7.3–30)
CALCIUM SPEC-SCNC: 9.8 MG/DL (ref 8.5–10.2)
CHLORIDE SERPL-SCNC: 104 MMOL/L (ref 98–107)
CO2 SERPL-SCNC: 25.9 MMOL/L (ref 22–29)
CREAT SERPL-MCNC: 0.9 MG/DL (ref 0.6–1.1)
DEPRECATED RDW RBC AUTO: 43.2 FL (ref 37–54)
EOSINOPHIL # BLD AUTO: 0.12 10*3/MM3 (ref 0–0.4)
EOSINOPHIL NFR BLD AUTO: 1.8 % (ref 0.3–6.2)
ERYTHROCYTE [DISTWIDTH] IN BLOOD BY AUTOMATED COUNT: 13 % (ref 12.3–15.4)
GFR SERPL CREATININE-BSD FRML MDRD: 59 ML/MIN/1.73
GFR SERPL CREATININE-BSD FRML MDRD: 71 ML/MIN/1.73
GLOBULIN UR ELPH-MCNC: 3.1 GM/DL (ref 1.8–3.5)
GLUCOSE SERPL-MCNC: 94 MG/DL (ref 74–124)
HCT VFR BLD AUTO: 39.4 % (ref 34–46.6)
HGB BLD-MCNC: 12.8 G/DL (ref 12–15.9)
IMM GRANULOCYTES # BLD AUTO: 0.01 10*3/MM3 (ref 0–0.05)
IMM GRANULOCYTES NFR BLD AUTO: 0.1 % (ref 0–0.5)
LYMPHOCYTES # BLD AUTO: 1.57 10*3/MM3 (ref 0.7–3.1)
LYMPHOCYTES NFR BLD AUTO: 23.4 % (ref 19.6–45.3)
MCH RBC QN AUTO: 29.6 PG (ref 26.6–33)
MCHC RBC AUTO-ENTMCNC: 32.5 G/DL (ref 31.5–35.7)
MCV RBC AUTO: 91 FL (ref 79–97)
MONOCYTES # BLD AUTO: 0.89 10*3/MM3 (ref 0.1–0.9)
MONOCYTES NFR BLD AUTO: 13.2 % (ref 5–12)
NEUTROPHILS NFR BLD AUTO: 4.07 10*3/MM3 (ref 1.7–7)
NEUTROPHILS NFR BLD AUTO: 60.6 % (ref 42.7–76)
NRBC BLD AUTO-RTO: 0 /100 WBC (ref 0–0.2)
PLATELET # BLD AUTO: 271 10*3/MM3 (ref 140–450)
PMV BLD AUTO: 9.4 FL (ref 6–12)
POTASSIUM SERPL-SCNC: 4.6 MMOL/L (ref 3.5–4.7)
PROT SERPL-MCNC: 7.4 G/DL (ref 6.3–8)
RBC # BLD AUTO: 4.33 10*6/MM3 (ref 3.77–5.28)
SODIUM SERPL-SCNC: 141 MMOL/L (ref 134–145)
WBC # BLD AUTO: 6.72 10*3/MM3 (ref 3.4–10.8)

## 2021-05-14 PROCEDURE — 85025 COMPLETE CBC W/AUTO DIFF WBC: CPT

## 2021-05-14 PROCEDURE — 36415 COLL VENOUS BLD VENIPUNCTURE: CPT

## 2021-05-14 PROCEDURE — 99213 OFFICE O/P EST LOW 20 MIN: CPT | Performed by: INTERNAL MEDICINE

## 2021-05-14 PROCEDURE — 80053 COMPREHEN METABOLIC PANEL: CPT

## 2021-05-14 NOTE — PROGRESS NOTES
Subjective    REASONS FOR FOLLOWUP:   1. 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   2. 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   3. Primary radiation to left lung bronchoalveolar carcinoma given in March 2013.   4. EGFR mutation negative in the left upper lobe cancer.   5. Multinodular goiter.          History of Present Illness patient is an 86-year-old female with 2 separate lung cancers treated with surgery on 1 occasion in 2002 and radiation on the other most recent one being in March 2013.      She comes in for follow-up today having missed last years follow-up because of the coronavirus pandemic.    She is now afraid to drive and her son who was visiting from [place] is taking her to all her medical appointments    Weight and appetite are stable     She is no longer taking her cough medicine because it was too expensive and is actually coughing less thankfully      We had decided to move to annual CAT scans because of her age and stability of imaging-but she tells me now that she does not want to know if she has cancer because she will not do any treatment for it and she thinks that she would rather not do any imaging and I think this is very reasonable at her age    I have not made her a follow-up appointment    She is a DNR  Active Ambulatory Problems     Diagnosis Date Noted   • Essential hypertension 03/29/2017   • Non-rheumatic mitral regurgitation 03/29/2017   • Malignant neoplasm of middle lobe of right lung (CMS/HCC) 08/25/2017   • Type 2 diabetes mellitus without complication (CMS/HCC) 07/18/2018   • Mixed hyperlipidemia 07/18/2018     Resolved Ambulatory Problems     Diagnosis Date Noted   • No Resolved Ambulatory Problems     Past Medical History:   Diagnosis Date   • Arthritis    • Diabetes mellitus (CMS/HCC)    • Disease of thyroid gland    • GERD (gastroesophageal reflux disease)    • Goiter    • Hypercholesterolemia    • Hypertension    •  Lung cancer (CMS/HCC)    • Lung cancer (CMS/HCC)    • Mitral valve regurgitation    • 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    • Vocal cord cyst        Past Surgical History:   Procedure Laterality Date   • CATARACT EXTRACTION Bilateral    • HYSTERECTOMY  1970   • LUNG LOBECTOMY Right 2002   • VOCAL CORD BIOPSY      Benign cyst removed     PAST SURGICAL HISTORY: Hysterectomy in , benign cyst removed from the vocal cord in , cataract surgery and right upper lobectomy in 2002.       OB/GYN HISTORY:  1, para 1.     ONCOLOGIC HISTORY: History from previous dates can be found in the separate document.   ONCOLOGIC HISTORY: Well differentiated adenocarcinoma left upper lobe measuring 2 x 2 x 1 cm, negative lymph nodes, T1N0 post right upper lobectomy. No adjuvant treatment.   The patient is an 82-year-old Chinese lady with history of T1N0 well-differentiated adenocarcinoma of the right upper lobe, treated with lobectomy in 2002 who was followed perspectively with scans and in  was noted to have a small abnormality in t he left lung which was described as ground glass opacity. This remained unchanged through , but in 2012 CT of the chest showed the ground glass opacity in the perihilar region appeared slightly larger and more confluent than the previous scan i n  and the curvilinear area of patchy focal density in the left apex that was new, which was felt to be scarring. There was no adenopathy appreciated. This led to a bronchoscopy by Dr. Tha Brandt in 2012, which was nondiagnostic. She was also referred to a gastroenterologist because of minimal rectal bleeding and underwent a colonoscopy, which was unremarkable. Followup chest CT on 10/17/12 without contrast at [facility] showed further enlargement of the lingular infiltrate to 2.3 x 2.2 cm comp a red to 1.8 x 2.2 in May. Dr. Brandt discussed the situation with her and told her that he did not think she would tolerate  surgery because of her age and the previous right upper lobectomy and felt that she may not do well with chemo and radiation either a nd because she wanted another opinion, she visited her son in [place] and went to [facility] where she was seen at the Lung Clinic. She was presented to their Tumor Board and they recommended that she have a CT with contrast and CT guided biopsy of the are a before making any decisions and repeat PFTs to see if she would be a surgical candidate. The patient proceeded with a CT on 12/27/12 and PFTs and decided to see me for a third opinion regarding her cancer and treatment options.   The patient was present ed at thoracic clinic and CT guided biopsy was felt to be the best approach as they did not think EBUS or navigational bronchoscopy would yield a diagnosis. The radiologist felt this was feasible and was performed 1/7/13 with the findings of a well diffe r entiated adenocarcinoma. Pre-biopsy PET scan showed very low level uptake in the lesion and no adenopathy. The patient was not felt to be a surgical candidate because of her age and previous right upper lobectomy and focused radiation was felt to be her best option. The patient had been to [facility] for a second opinion and they also concurred with this approach. EGFR mutation status is not known but ordered because of her ethnic origin and the fact that if she has recurrence of disease after radiation, thi s could be a consideration of therapy.   The patient is EGFR mutation negative.   Patient treated with primary radiation to the left lung carcinoma in March. CAT scans dated 5/22/13 showed ground-glass opacity in the left hilar region shrunk from 3.1 x 3.1 to 2.2 x 2.   The patient was seen on 02/17/2014 with a repeat CT scan of the chest and abdomen, dated 02/10/2014 that shows a multinodular goiter, which is unchanged, and further resolution of the radiation changes with no definite mass, and the abdomen is benign.   CAT scans  dated 2014 shows persistent finding of chronic infiltrates stable since February. No signs of recurrent cancer (no contrast given).   The patient was seen 2015 with a CAT scan that shows stable radiation changes in the left upper lobe and stable small nodule in the right upper lobe. Continued followup planned.   The patient is seen on 2015 with a CT scan of chest and abdomen with contrast that showed no evidence of recurrent cancer. There are radiation changes in left l nora with fiducial markers in the region, and previous right upper lobectomy and a stable 6 mm nodule in the right lobe unchanged for over 3 years. Abdomen is benign.   Patient was seen on 2016 with a CT scan of the chest without contrast that showed a stable 6 mm nodule in the right middle lobe and no other lung nodules.    patient is an 86-year-old female with remote history of bronchioloalveolar carcinoma in  and a second malignancy in the left lung in  treated with focused radiation.  She comes in today with a 6 month follow-up and scans and overall she is doing well.  She has no weight loss or hemoptysis or pain in the chest.  She has a chronic cough but is not very significant and does not require much treatment.  Appetite and energy are fine.  She continues with mammography and follows with her family doctor for her medical issues routinely.  Thankfully the CAT scan shows stable 6mm  nodule in the right middle lobe since  with no new abnormalities.  Her thyroid goiter is also stable.  She's had a mammogram and this was normal       SOCIAL HISTORY: She is  for the second time. She smoked briefly for 18 years and stopped 50 years ago. She is not a drinker.     FAMILY HISTORY: Father  at age 65 of a blood clot. Mother  at age 86 of a fall. She has 2 brothers and 3 sisters. One sister had breast c ancer at a young age and  of kidney failure and one from complications of schizophrenia at 56. Another  sister  of heart problems at age 65. No other cancer in the family. Her son is healthy.     Review of Systems   Constitutional: Negative for chills, fatigue and fever.   Respiratory: Positive for cough (better 19). Negative for chest tightness and shortness of breath.    Cardiovascular: Negative for chest pain.   Gastrointestinal: Negative for constipation, diarrhea, nausea and vomiting.   Genitourinary: Negative for difficulty urinating.   Musculoskeletal: Positive for arthralgias (fingers 19) and myalgias (legs and feet cramps at night 19).   Neurological: Negative for dizziness, numbness and headaches.   Psychiatric/Behavioral: The patient is not nervous/anxious.       A comprehensive 14 point review of systems was performed and was negative except as mentioned.      Current Outpatient Medications:   •  ACETAMINOPHEN PO, Take  by mouth As Needed., Disp: , Rfl:   •  amLODIPine (NORVASC) 10 MG tablet, Take 1 tablet by mouth Daily., Disp: , Rfl:   •  Aspirin (ASPIR-81 PO), Take 81 mg by mouth Daily., Disp: , Rfl:   •  atenolol (TENORMIN) 25 MG tablet, Take 1 tablet by mouth 2 (Two) Times a Day., Disp: 270 tablet, Rfl: 1  •  Cholecalciferol (VITAMIN D) 1000 UNITS tablet, Take 2 tablets by mouth., Disp: , Rfl:   •  Docusate Sodium (STOOL SOFTENER) 100 MG capsule, Take  by mouth., Disp: , Rfl:   •  fenofibrate (TRICOR) 145 MG tablet, Take  by mouth daily., Disp: , Rfl:   •  glimepiride (AMARYL) 1 MG tablet, Take 1 mg by mouth 2 (Two) Times a Day., Disp: , Rfl:   •  Magnesium 250 MG tablet, Take  by mouth Daily., Disp: , Rfl:   •  rosuvastatin (CRESTOR) 10 MG tablet, Take 10 mg by mouth., Disp: , Rfl:   •  B Complex Vitamins (VITAMIN B COMPLEX 100 IJ), vitamin B complex, Disp: , Rfl:   •  hydrALAZINE (APRESOLINE) 50 MG tablet, , Disp: , Rfl:   •  HYDROcodone-homatropine (HYCODAN) 5-1.5 MG/5ML syrup, Take 5 mL by mouth Every 6 (Six) Hours As Needed for Cough., Disp: 240 mL, Rfl: 0  •  losartan (COZAAR)  "100 MG tablet, Take 1 tablet by mouth Daily., Disp: , Rfl:     ALLERGIES:    Allergies   Allergen Reactions   • Ace Inhibitors Myalgia   • Contrast Dye    • Iodinated Diagnostic Agents Rash       Objective      Vitals:    05/14/21 1330   BP: 126/73  Comment: w/b/p med taken at 7:30am   Pulse: 59   Resp: 16   Temp: 97.1 °F (36.2 °C)   TempSrc: Skin   SpO2: 97%   Weight: 44.1 kg (97 lb 4.8 oz)   Height: 149.9 cm (59.02\")   PainSc: 0-No pain     Current Status 2/22/2019   ECOG score 0       Physical Exam    GENERAL:  Well-developed, well-nourished in no acute distress.   SKIN:  Warm, dry without rashes, purpura or petechiae.  NECK:  Supple with good range of motion; thyromegaly noted no  masses, no JVD.  LYMPHATICS:  No cervical, supraclavicular, axillary or inguinal adenopathy.  CHEST:  Lungs clear to percussion and auscultation. Good airflow.  CARDIAC:  Regular rate and rhythm without murmurs, rubs or gallops. Normal S1,S2.  ABDOMEN:  Soft, nontender with no organomegaly or masses.  EXTREMITIES:  No clubbing, cyanosis or edema.  NEUROLOGICAL:  Cranial Nerves II-XII grossly intact.  No focal neurological deficits.  PSYCHIATRIC:  Normal affect and mood.        RECENT LABS:  Hematology WBC   Date Value Ref Range Status   05/14/2021 6.72 3.40 - 10.80 10*3/mm3 Final     RBC   Date Value Ref Range Status   05/14/2021 4.33 3.77 - 5.28 10*6/mm3 Final     Hemoglobin   Date Value Ref Range Status   05/14/2021 12.8 12.0 - 15.9 g/dL Final     Hematocrit   Date Value Ref Range Status   05/14/2021 39.4 34.0 - 46.6 % Final     Platelets   Date Value Ref Range Status   05/14/2021 271 140 - 450 10*3/mm3 Final                Chest x-ray pending      CT CHEST WO CONTRAST  CONCLUSION: Stable CT scan of the chest similar to 02/05/2018. 6 mm  right middle lobe pulmonary nodule remains stable. Postradiation  treatment change involving the left lung remains stable as well. No  acute intrathoracic abnormality.     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   3.primary radiation to left lung bronchoalveolar carcinoma given in March 2013.   4.EGFR mutation negative in the left upper lobe cancer.   5.Multinodular goiter.     Plan  No follow-up needed as she does not want treatment even if she gets another lung cancer and I think this is very reasonable at her age    She tells me she is a DNR and ready to go at any time because she is not very happy with her quality of life      CC:

## 2022-05-19 ENCOUNTER — OFFICE VISIT (OUTPATIENT)
Dept: CARDIOLOGY | Facility: CLINIC | Age: 87
End: 2022-05-19

## 2022-05-19 VITALS
SYSTOLIC BLOOD PRESSURE: 142 MMHG | BODY MASS INDEX: 20.16 KG/M2 | DIASTOLIC BLOOD PRESSURE: 60 MMHG | HEIGHT: 59 IN | HEART RATE: 51 BPM | OXYGEN SATURATION: 98 % | WEIGHT: 100 LBS

## 2022-05-19 DIAGNOSIS — I10 ESSENTIAL HYPERTENSION: ICD-10-CM

## 2022-05-19 DIAGNOSIS — I34.0 NON-RHEUMATIC MITRAL REGURGITATION: Primary | ICD-10-CM

## 2022-05-19 PROBLEM — I44.7 LBBB (LEFT BUNDLE BRANCH BLOCK): Status: ACTIVE | Noted: 2022-05-19

## 2022-05-19 PROCEDURE — 99213 OFFICE O/P EST LOW 20 MIN: CPT | Performed by: INTERNAL MEDICINE

## 2022-05-19 PROCEDURE — 93000 ELECTROCARDIOGRAM COMPLETE: CPT | Performed by: INTERNAL MEDICINE

## 2022-05-19 NOTE — PROGRESS NOTES
OFFICE VISIT      Date of Office Visit: 2022    Patient Name: Gael Tarango  : 1930    Encounter Provider: Moe Heredia MD  Referring Provider: No ref. provider found  Primary Care Provider: Maurice Castro MD  Place of Service: [facility]        Chief Complaint   Patient presents with   • Hypertension     Yearly f/u     History of Present Illness    The patient is a 89-year-old white female with hypertension as well as mitral regurgitation and hyperlipidemia who returns to the office today for follow-up.  She has been feeling very well in the last year.  She has not been hospitalized.  She denies any significant shortness of breath, lightheadedness, dizziness or peripheral edema.    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   • Vocal cord cyst 1972    Removed         Past Surgical History:   Procedure Laterality Date   • CATARACT EXTRACTION Bilateral    • HYSTERECTOMY  1970   • LUNG LOBECTOMY Right 2002   • VOCAL CORD BIOPSY  1972    Benign cyst removed           Current Outpatient Medications:   •  ACETAMINOPHEN PO, Take  by mouth As Needed., Disp: , Rfl:   •  amLODIPine (NORVASC) 10 MG tablet, Take 1 tablet by mouth Daily., Disp: , Rfl:   •  Aspirin (ASPIR-81 PO), Take 81 mg by mouth Daily., Disp: , Rfl:   •  atenolol (TENORMIN) 25 MG tablet, Take 1 tablet by mouth 2 (Two) Times a Day., Disp: 270 tablet, Rfl: 1  •  Cholecalciferol (VITAMIN D) 1000 UNITS tablet, Take 1 tablet by mouth., Disp: ,  "Rfl:   •  fenofibrate (TRICOR) 145 MG tablet, Take  by mouth daily., Disp: , Rfl:   •  glimepiride (AMARYL) 1 MG tablet, Take 1 mg by mouth 2 (Two) Times a Day., Disp: , Rfl:   •  hydrALAZINE (APRESOLINE) 50 MG tablet, 50 mg 2 (Two) Times a Day., Disp: , Rfl:   •  Magnesium 250 MG tablet, Take  by mouth Daily., Disp: , Rfl:   •  rosuvastatin (CRESTOR) 10 MG tablet, Take 10 mg by mouth., Disp: , Rfl:       Social History     Socioeconomic History   • Marital status:    • Years of education: College   Tobacco Use   • Smoking status: Former Smoker     Packs/day: 1.00     Years: 18.00     Pack years: 18.00     Types: Cigarettes   • Smokeless tobacco: Never Used   • Tobacco comment: caffeine use   Substance and Sexual Activity   • Alcohol use: No   • Drug use: No   • Sexual activity: Defer         Review of Systems   Constitutional: Negative.   HENT: Negative.    Eyes: Negative.    Cardiovascular: Negative.    Respiratory: Negative.    Endocrine: Negative.    Skin: Negative.    Musculoskeletal: Negative.    Gastrointestinal: Negative.    Neurological: Negative.    Psychiatric/Behavioral: Negative.        Procedures      ECG 12 Lead    Date/Time: 5/19/2022 12:51 PM  Performed by: Moe Heredia MD  Authorized by: Moe Heredia MD   Comparison: compared with previous ECG from 5/12/2021  Similar to previous ECG  Rhythm: sinus rhythm  Rate: normal  Conduction: conduction normal  ST Segments: ST segments normal  QRS axis: normal                  Objective:    /60   Pulse 51   Ht 149.9 cm (59\")   Wt 45.4 kg (100 lb)   SpO2 98%   BMI 20.20 kg/m²         Vitals reviewed.   Constitutional:       Appearance: Healthy appearance. Well-developed and not in distress.   Eyes:      Pupils: Pupils are equal, round, and reactive to light.   HENT:      Head: Normocephalic.   Neck:      Thyroid: No thyromegaly.      Vascular: No carotid bruit or JVD.   Pulmonary:      Effort: Pulmonary effort is normal.      " Breath sounds: Normal breath sounds.   Cardiovascular:      Normal rate. Regular rhythm. Normal S1. Normal S2.      Murmurs: There is a grade 1/6 high frequency blowing holosystolic murmur at the apex.      No gallop.   Pulses:     Intact distal pulses.   Edema:     Peripheral edema absent.   Abdominal:      General: Bowel sounds are normal.      Palpations: Abdomen is soft.   Musculoskeletal:      Cervical back: Normal range of motion. Skin:     General: Skin is warm and dry.      Findings: No erythema.   Neurological:      Mental Status: Alert and oriented to person, place, and time.             Assessment & Plan:       Diagnosis Plan   1. Non-rheumatic mitral regurgitation     2. Essential hypertension         1.  Mitral regurgitation: Mild to moderate.  Asymptomatic  2.  Hypertension: Controlled  3.  Hyperlipidemia: On statin therapy  4.  Diabetes mellitus: On oral therapy  5.  Chronic left bundle branch block  Patient appears to be clinically stable.  We will follow-up in 1 year

## 2022-06-23 ENCOUNTER — TELEPHONE (OUTPATIENT)
Dept: CARDIOLOGY | Facility: CLINIC | Age: 87
End: 2022-06-23

## 2022-06-23 NOTE — TELEPHONE ENCOUNTER
Mrs. Tarango called stating she is having some slight swelling in her left foot.  She was worried about it and wanted to know it she needed to do anything about it?    Please advise,    Claudia

## 2023-05-24 ENCOUNTER — OFFICE VISIT (OUTPATIENT)
Dept: CARDIOLOGY | Facility: CLINIC | Age: 88
End: 2023-05-24
Payer: MEDICARE

## 2023-05-24 VITALS
BODY MASS INDEX: 19.68 KG/M2 | DIASTOLIC BLOOD PRESSURE: 62 MMHG | SYSTOLIC BLOOD PRESSURE: 110 MMHG | WEIGHT: 97.6 LBS | HEART RATE: 50 BPM | HEIGHT: 59 IN

## 2023-05-24 DIAGNOSIS — I10 ESSENTIAL HYPERTENSION: Primary | ICD-10-CM

## 2023-05-24 DIAGNOSIS — I34.0 NON-RHEUMATIC MITRAL REGURGITATION: ICD-10-CM

## 2023-05-24 DIAGNOSIS — E78.2 MIXED HYPERLIPIDEMIA: ICD-10-CM

## 2023-05-24 DIAGNOSIS — I44.7 LBBB (LEFT BUNDLE BRANCH BLOCK): ICD-10-CM

## 2023-05-24 PROCEDURE — 93000 ELECTROCARDIOGRAM COMPLETE: CPT | Performed by: NURSE PRACTITIONER

## 2023-05-24 PROCEDURE — 99214 OFFICE O/P EST MOD 30 MIN: CPT | Performed by: NURSE PRACTITIONER

## 2023-05-24 NOTE — PROGRESS NOTES
Date of Office Visit: 2023  Encounter Provider: CATHY Hansen  Place of Service: [facility] CARDIOLOGY  Patient Name: Gael Tarango  :1930    No chief complaint on file.  : follow up    HPI: Gael Tarango is a 89 y.o. female who is a patient who previously followed with Dr. Heredia.  She is new to me today and presents for 1 year office follow-up.  She has a history of hypertension, hyperlipidemia, chronic left bundle branch block, as well as mitral regurgitation.  On her last office visit she was doing well with no complaints.    Ms. Tarango presents today with no complaints.  She continues to perform her activities of daily living.  She cooks every now and then.  She walks on her treadmill about 5 minutes a day.  Her blood pressure is well controlled on all of her office visits.  She says checking it at home is too stressful.  Her EKG is stable.  On physical exam, she has a systolic murmur.  She is euvolemic and has no complaints of shortness of air, lightheadedness or chest pressure.    Previous testing and notes have been reviewed by me.   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       Past Surgical History:   Procedure Laterality Date   • CATARACT EXTRACTION Bilateral    • HYSTERECTOMY  1970   • LUNG LOBECTOMY Right 2002   • VOCAL CORD BIOPSY  1972    Benign cyst removed       Social History     Socioeconomic History   • Marital status:    • Years of education: College   Tobacco Use   •  Smoking status: Former     Packs/day: 1.00     Years: 18.00     Pack years: 18.00     Types: Cigarettes   • Smokeless tobacco: Never   • Tobacco comments:     caffeine use   Substance and Sexual Activity   • Alcohol use: No   • Drug use: No   • Sexual activity: Defer       Family History   Problem Relation Age of Onset   • Tuberculosis Mother    • Clotting disorder Father    • Hypertension Father    • Heart disease Sister    • Mental illness Sister         Schizophrenia   • Diabetes Brother    • Heart disease Brother    • Hypertension Brother    • Cancer Sister         Breast   • Kidney disease Sister    • Thyroid disease Sister        Review of Systems   Constitutional: Negative.   HENT: Negative.    Eyes: Negative.    Cardiovascular: Negative.    Respiratory: Negative.    Endocrine: Negative.    Hematologic/Lymphatic: Negative.    Skin: Negative.    Musculoskeletal: Negative.    Gastrointestinal: Negative.    Genitourinary: Negative.    Neurological: Negative.    Psychiatric/Behavioral: Negative.    Allergic/Immunologic: Negative.        Allergies   Allergen Reactions   • Ace Inhibitors Myalgia   • Contrast Dye (Echo Or Unknown Ct/Mr)    • Iodinated Contrast Media Rash         Current Outpatient Medications:   •  ACETAMINOPHEN PO, Take  by mouth As Needed., Disp: , Rfl:   •  amLODIPine (NORVASC) 10 MG tablet, Take 1 tablet by mouth Daily., Disp: , Rfl:   •  Aspirin (ASPIR-81 PO), Take 81 mg by mouth Daily., Disp: , Rfl:   •  atenolol (TENORMIN) 25 MG tablet, Take 1 tablet by mouth 2 (Two) Times a Day., Disp: 270 tablet, Rfl: 1  •  Cholecalciferol (VITAMIN D) 1000 UNITS tablet, Take 1 tablet by mouth., Disp: , Rfl:   •  fenofibrate (TRICOR) 145 MG tablet, Take  by mouth daily., Disp: , Rfl:   •  glimepiride (AMARYL) 1 MG tablet, Take 1 mg by mouth 2 (Two) Times a Day., Disp: , Rfl:   •  hydrALAZINE (APRESOLINE) 50 MG tablet, 50 mg 2 (Two) Times a Day., Disp: , Rfl:   •  Magnesium 250 MG tablet, Take  by mouth  Daily., Disp: , Rfl:   •  rosuvastatin (CRESTOR) 10 MG tablet, Take 10 mg by mouth., Disp: , Rfl:       Objective:     There were no vitals filed for this visit.  There is no height or weight on file to calculate BMI.     48-hour Holter monitor 3/24/2017:  Normal sinus rhythm with occasional unifocal PVCs and rare PACs.    PHYSICAL EXAM:    Constitutional:       Appearance: Healthy appearance. Not in distress.   Neck:      Vascular: No JVR. JVD normal.   Pulmonary:      Effort: Pulmonary effort is normal.      Breath sounds: Normal breath sounds. No wheezing. No rhonchi. No rales.   Chest:      Chest wall: Not tender to palpatation.   Cardiovascular:      PMI at left midclavicular line. Normal rate. Regular rhythm. Normal S1. Normal S2.      Murmurs: There is a systolic murmur.      No gallop. No click. No rub.   Pulses:     Intact distal pulses.   Edema:     Peripheral edema absent.   Abdominal:      General: Bowel sounds are normal.      Palpations: Abdomen is soft.      Tenderness: There is no abdominal tenderness.   Musculoskeletal: Normal range of motion.         General: No tenderness. Skin:     General: Skin is warm and dry.   Neurological:      General: No focal deficit present.      Mental Status: Alert and oriented to person, place and time.           ECG 12 Lead    Date/Time: 5/24/2023 11:46 AM  Performed by: Karyn Feliz APRN  Authorized by: Karyn Feliz APRN   Comparison: compared with previous ECG from 5/19/2022  Similar to previous ECG  Rhythm: sinus rhythm  Rate: normal  BPM: 50  Conduction: left bundle branch block              Assessment:       Diagnosis Plan   1. Essential hypertension        2. Non-rheumatic mitral regurgitation        3. Mixed hyperlipidemia        4. LBBB (left bundle branch block)          No orders of the defined types were placed in this encounter.         Plan:       1.  Hypertension: Controlled  2.  Hyperlipidemia: Followed by PCP.  On fenofibrate and  statin  3.  Mitral regurgitation: Mild to moderate per office note 2016.  Positive murmur.  Euvolemic.  No SOA  4.  Left bundle branch block: Chronic    Ms. Tarango will follow-up with Dr. Lopez in 1 year.  She will call sooner for any questions or concerns.         Your medication list          Accurate as of May 24, 2023  9:02 AM. If you have any questions, ask your nurse or doctor.            CONTINUE taking these medications      Instructions Last Dose Given Next Dose Due   ACETAMINOPHEN PO      Take  by mouth As Needed.       amLODIPine 10 MG tablet  Commonly known as: NORVASC      Take 1 tablet by mouth Daily.       ASPIR-81 PO      Take 81 mg by mouth Daily.       atenolol 25 MG tablet  Commonly known as: TENORMIN      Take 1 tablet by mouth 2 (Two) Times a Day.       cholecalciferol 25 MCG (1000 UT) tablet  Commonly known as: VITAMIN D3      Take 1 tablet by mouth.       fenofibrate 145 MG tablet  Commonly known as: TRICOR      Take  by mouth daily.       glimepiride 1 MG tablet  Commonly known as: AMARYL      Take 1 mg by mouth 2 (Two) Times a Day.       hydrALAZINE 50 MG tablet  Commonly known as: APRESOLINE      50 mg 2 (Two) Times a Day.       Magnesium 250 MG tablet      Take  by mouth Daily.       rosuvastatin 10 MG tablet  Commonly known as: CRESTOR      Take 10 mg by mouth.                As always, it has been a pleasure to participate in your patient's care.      Sincerely,       CATHY Hendrickson